# Patient Record
Sex: MALE | Race: ASIAN | Employment: PART TIME | URBAN - METROPOLITAN AREA
[De-identification: names, ages, dates, MRNs, and addresses within clinical notes are randomized per-mention and may not be internally consistent; named-entity substitution may affect disease eponyms.]

---

## 2018-06-29 ENCOUNTER — OFFICE VISIT (OUTPATIENT)
Dept: FAMILY MEDICINE CLINIC | Age: 43
End: 2018-06-29

## 2018-06-29 VITALS
BODY MASS INDEX: 36.68 KG/M2 | RESPIRATION RATE: 17 BRPM | SYSTOLIC BLOOD PRESSURE: 140 MMHG | TEMPERATURE: 96.8 F | HEART RATE: 113 BPM | DIASTOLIC BLOOD PRESSURE: 104 MMHG | HEIGHT: 68 IN | WEIGHT: 242 LBS

## 2018-06-29 DIAGNOSIS — J20.9 ACUTE BRONCHITIS, UNSPECIFIED ORGANISM: ICD-10-CM

## 2018-06-29 DIAGNOSIS — M10.00 IDIOPATHIC GOUT, UNSPECIFIED CHRONICITY, UNSPECIFIED SITE: Primary | ICD-10-CM

## 2018-06-29 PROBLEM — E66.01 SEVERE OBESITY (BMI 35.0-39.9): Status: ACTIVE | Noted: 2018-06-29

## 2018-06-29 RX ORDER — COLCHICINE 0.6 MG/1
0.6 TABLET ORAL DAILY
COMMUNITY
End: 2019-03-18

## 2018-06-29 RX ORDER — BENZONATATE 100 MG/1
100 CAPSULE ORAL
COMMUNITY
End: 2019-03-18

## 2018-06-29 RX ORDER — AZITHROMYCIN 250 MG/1
TABLET, FILM COATED ORAL
Qty: 6 TAB | Refills: 0 | Status: SHIPPED | OUTPATIENT
Start: 2018-06-29 | End: 2018-09-20 | Stop reason: ALTCHOICE

## 2018-06-29 RX ORDER — ALLOPURINOL 300 MG/1
TABLET ORAL DAILY
COMMUNITY

## 2018-06-29 RX ORDER — PREDNISONE 5 MG/1
TABLET ORAL
COMMUNITY
End: 2019-03-18

## 2018-06-29 NOTE — PROGRESS NOTES
Sourav Michelle is a 37 y.o. male presents to office to establish care. Follow up patient first for elevated hemolobin and hematocrit    Medication list has been reviewed with Sourav Michelle and updated as of today's date     Patient has been asked if refills needed as of today's date in which they replied;  NO    Hearing/Vision:   No exam data present    Learning Assessment:     Learning Assessment 6/29/2018   PRIMARY LEARNER Patient   PRIMARY LANGUAGE ENGLISH   LEARNER PREFERENCE PRIMARY DEMONSTRATION   ANSWERED BY patient   RELATIONSHIP SELF       Depression Screening:     PHQ over the last two weeks 7/3/2018   Little interest or pleasure in doing things Not at all   Feeling down, depressed or hopeless Not at all   Total Score PHQ 2 0       Fall Risk Assessment:     Fall Risk Assessment, last 12 mths 6/29/2018   Able to walk? Yes   Fall in past 12 months? No       Abuse Screening:     Abuse Screening Questionnaire 6/29/2018   Do you ever feel afraid of your partner? N   Are you in a relationship with someone who physically or mentally threatens you? N   Is it safe for you to go home?  [place]

## 2018-06-30 NOTE — PROGRESS NOTES
Catrina Castellanos is a 37 y.o.  male and presents as new patient visit with need to be referred to Rheumatology for chronic gout. He also c/o a few days of productive cough. No chronic URI or LRI symptoms. Subjective: Additional Concerns: none     Patient Active Problem List    Diagnosis Date Noted    Severe obesity (BMI 35.0-39.9) ([facility] [address] 06/29/2018     Current Outpatient Prescriptions   Medication Sig Dispense Refill    benzonatate (TESSALON) 100 mg capsule Take 100 mg by mouth three (3) times daily as needed for Cough.  colchicine 0.6 mg tablet Take 0.6 mg by mouth daily.  predniSONE (DELTASONE) 5 mg tablet Take  by mouth.  allopurinol (ZYLOPRIM) 300 mg tablet Take  by mouth daily.  azithromycin (ZITHROMAX) 250 mg tablet 2 today, then 1 daily till gone. 6 Tab 0     No Known Allergies  Past Medical History:   Diagnosis Date    Gout     Hx of biopsy      Past Surgical History:   Procedure Laterality Date    NEUROLOGICAL PROCEDURE UNLISTED       History reviewed. No pertinent family history.   Social History   Substance Use Topics    Smoking status: Never Smoker    Smokeless tobacco: Never Used    Alcohol use 0.6 - 1.8 oz/week     1 - 3 Cans of beer per week     ROS     General: negative for - chills, fatigue, fever, weight change  Psych: negative for - anxiety, depression, irritability or mood swings  ENT: negative for - headaches, hearing change, nasal congestion, oral lesions, sneezing or sore throat  Heme/ Lymph: negative for - bleeding problems, bruising, pallor or swollen lymph nodes  Endo: negative for - hot flashes, polydipsia/polyuria or temperature intolerance  Resp: positive for -  Productive cough, no shortness of breath or wheezing  CV: negative for - chest pain, edema or palpitations    Objective:  Vitals:    06/29/18 1341   BP: (!) 140/104   Pulse: (!) 113   Resp: 17   Temp: 96.8 °F (36 °C)   TempSrc: Oral   Weight: 242 lb (109.8 kg)   Height: 5' 8\" (1.727 m)   PainSc: 5     PE    alert, well appearing, and in no distress, oriented to person, place, and time and overweight  General appearance - alert, well appearing, and in no distress, oriented to person, place, and time and overweight  Mental status - alert, oriented to person, place, and time, normal mood, behavior, speech, dress, motor activity, and thought processes  Chest - clear to auscultation, no wheezes, rales or rhonchi, symmetric air entry  Heart - normal rate, regular rhythm, normal S1, S2, no murmurs, rubs, clicks or gallops  Musculoskeletal - no joint tenderness, deformity or swelling  Extremities - peripheral pulses normal, no pedal edema, no clubbing or cyanosis    LABS   No results found for any previous visit. TESTS  No results found for this or any previous visit. Assessment/Plan:      1. Idiopathic gout, unspecified chronicity, unspecified site  - REFERRAL TO RHEUMATOLOGY    2. Acute bronchitis - Empiric treatment with z kris and symptomatic treatment OTC as needed. Lab review: orders written for new lab studies as appropriate; see orders    I have discussed the diagnosis with the patient and the intended plan as seen in the above orders. The patient has received an after-visit summary and questions were answered concerning future plans. I have discussed medication side effects and warnings with the patient as well. I have reviewed the plan of care with the patient, accepted their input and they are in agreement with the treatment goals. F/U in one week only of not better.      Maciej Lopez MD

## 2018-06-30 NOTE — PATIENT INSTRUCTIONS
Bronchitis: Care Instructions  Your Care Instructions    Bronchitis is inflammation of the bronchial tubes, which carry air to the lungs. The tubes swell and produce mucus, or phlegm. The mucus and inflamed bronchial tubes make you cough. You may have trouble breathing. Most cases of bronchitis are caused by viruses like those that cause colds. Antibiotics usually do not help and they may be harmful. Bronchitis usually develops rapidly and lasts about 2 to 3 weeks in otherwise healthy people. Follow-up care is a key part of your treatment and safety. Be sure to make and go to all appointments, and call your doctor if you are having problems. It's also a good idea to know your test results and keep a list of the medicines you take. How can you care for yourself at home? · Take all medicines exactly as prescribed. Call your doctor if you think you are having a problem with your medicine. · Get some extra rest.  · Take an over-the-counter pain medicine, such as acetaminophen (Tylenol), ibuprofen (Advil, Motrin), or naproxen (Aleve) to reduce fever and relieve body aches. Read and follow all instructions on the label. · Do not take two or more pain medicines at the same time unless the doctor told you to. Many pain medicines have acetaminophen, which is Tylenol. Too much acetaminophen (Tylenol) can be harmful. · Take an over-the-counter cough medicine that contains dextromethorphan to help quiet a dry, hacking cough so that you can sleep. Avoid cough medicines that have more than one active ingredient. Read and follow all instructions on the label. · Breathe moist air from a humidifier, hot shower, or sink filled with hot water. The heat and moisture will thin mucus so you can cough it out. · Do not smoke. Smoking can make bronchitis worse. If you need help quitting, talk to your doctor about stop-smoking programs and medicines. These can increase your chances of quitting for good.   When should you call for help? Call 911 anytime you think you may need emergency care. For example, call if:  ? · You have severe trouble breathing. ?Call your doctor now or seek immediate medical care if:  ? · You have new or worse trouble breathing. ? · You cough up dark brown or bloody mucus (sputum). ? · You have a new or higher fever. ? · You have a new rash. ? Watch closely for changes in your health, and be sure to contact your doctor if:  ? · You cough more deeply or more often, especially if you notice more mucus or a change in the color of your mucus. ? · You are not getting better as expected. Where can you learn more? Go to http://fer-devin.info/. Enter H333 in the search box to learn more about \"Bronchitis: Care Instructions. \"  Current as of: May 12, 2017  Content Version: 11.4  © 6439-9803 [employer]. Care instructions adapted under license by [employer] (which disclaims liability or warranty for this information). If you have questions about a medical condition or this instruction, always ask your healthcare professional. [place] [address] any warranty or liability for your use of this information. Gout: Care Instructions  Your Care Instructions    Gout is a form of arthritis caused by a buildup of uric acid crystals in a joint. It causes sudden attacks of pain, swelling, redness, and stiffness, usually in one joint, especially the big toe. Gout usually comes on without a cause. But it can be brought on by drinking alcohol (especially beer) or eating seafood and red meat. Taking certain medicines, such as diuretics or aspirin, also can bring on an attack of gout. Taking your medicines as prescribed and following up with your doctor regularly can help you avoid gout attacks in the future. Follow-up care is a key part of your treatment and safety.  Be sure to make and go to all appointments, and call your doctor if you are having problems. It's also a good idea to know your test results and keep a list of the medicines you take. How can you care for yourself at home? · If the joint is swollen, put ice or a cold pack on the area for 10 to 20 minutes at a time. Put a thin cloth between the ice and your skin. · Prop up the sore limb on a pillow when you ice it or anytime you sit or lie down during the next 3 days. Try to keep it above the level of your heart. This will help reduce swelling. · Rest sore joints. Avoid activities that put weight or strain on the joints for a few days. Take short rest breaks from your regular activities during the day. · Take your medicines exactly as prescribed. Call your doctor if you think you are having a problem with your medicine. · Take pain medicines exactly as directed. ¨ If the doctor gave you a prescription medicine for pain, take it as prescribed. ¨ If you are not taking a prescription pain medicine, ask your doctor if you can take an over-the-counter medicine. · Eat less seafood and red meat. · Check with your doctor before drinking alcohol. · Losing weight, if you are overweight, may help reduce attacks of gout. But do not go on a [employer]. \" Losing a lot of weight in a short amount of time can cause a gout attack. When should you call for help? Call your doctor now or seek immediate medical care if:  ? · You have a fever. ? · The joint is so painful you cannot use it. ? · You have sudden, unexplained swelling, redness, warmth, or severe pain in one or more joints. ? Watch closely for changes in your health, and be sure to contact your doctor if:  ? · You have joint pain. ? · Your symptoms get worse or are not improving after 2 or 3 days. Where can you learn more? Go to http://fer-devin.info/. Enter R668 in the search box to learn more about \"Gout: Care Instructions. \"  Current as of: October 31, 2016  Content Version: 11.4  © 4616-1900 [employer]. Care instructions adapted under license by [employer] (which disclaims liability or warranty for this information). If you have questions about a medical condition or this instruction, always ask your healthcare professional. [address] any warranty or liability for your use of this information. Purine-Restricted Diet: Care Instructions  Your Care Instructions    Purines are substances that are found in some foods. Your body turns purines into uric acid. High levels of uric acid can cause gout, which is a form of arthritis that causes pain and inflammation in joints. You may be able to help control the amount of uric acid in your body by limiting high-purine foods in your diet. Follow-up care is a key part of your treatment and safety. Be sure to make and go to all appointments, and call your doctor if you are having problems. It's also a good idea to know your test results and keep a list of the medicines you take. How can you care for yourself at home? · Plan your meals and snacks around foods that are low in purines and are safe for you to eat. These foods include:  ¨ Green vegetables and tomatoes. ¨ Fruits. ¨ Whole-grain breads, rice, and cereals. ¨ Eggs, peanut butter, and nuts. ¨ Low-fat milk, cheese, and other milk products. ¨ Popcorn. ¨ Gelatin desserts, chocolate, cocoa, and cakes and sweets, in small amounts. · You can eat certain foods that are medium-high in purines, but eat them only once in a while. These foods include:  ¨ Legumes, such as dried beans and dried peas. You can have 1 cup cooked legumes each day. ¨ Asparagus, cauliflower, spinach, mushrooms, and green peas. ¨ Fish and seafood (other than very high-purine seafood). ¨ Oatmeal, wheat bran, and wheat germ. · Limit very high-purine foods, including:  ¨ Organ meats, such as liver, kidneys, sweetbreads, and brains. ¨ Meats, including harden, beef, pork, and lamb.   ¨ Game meats and any other meats in large amounts. ¨ Anchovies, sardines, herring, mackerel, and scallops. ¨ Gravy. ¨ Beer. Where can you learn more? Go to http://fer-devin.info/. Enter F448 in the search box to learn more about \"Purine-Restricted Diet: Care Instructions. \"  Current as of: May 12, 2017  Content Version: 11.4  © 0935-6039 Healthwise, [employer]. Care instructions adapted under license by [employer] (which disclaims liability or warranty for this information). If you have questions about a medical condition or this instruction, always ask your healthcare professional. [place] [address] any warranty or liability for your use of this information.

## 2018-09-20 ENCOUNTER — OFFICE VISIT (OUTPATIENT)
Dept: FAMILY MEDICINE CLINIC | Age: 43
End: 2018-09-20

## 2018-09-20 ENCOUNTER — HOSPITAL ENCOUNTER (OUTPATIENT)
Dept: LAB | Age: 43
Discharge: HOME | End: 2018-09-20
Payer: MEDICAID

## 2018-09-20 VITALS
HEIGHT: 68 IN | OXYGEN SATURATION: 98 % | RESPIRATION RATE: 17 BRPM | BODY MASS INDEX: 36.22 KG/M2 | DIASTOLIC BLOOD PRESSURE: 88 MMHG | TEMPERATURE: 96.8 F | HEART RATE: 93 BPM | SYSTOLIC BLOOD PRESSURE: 134 MMHG | WEIGHT: 239 LBS

## 2018-09-20 DIAGNOSIS — Z00.00 PHYSICAL EXAM: ICD-10-CM

## 2018-09-20 DIAGNOSIS — M10.00 ACUTE IDIOPATHIC GOUT, UNSPECIFIED SITE: Primary | ICD-10-CM

## 2018-09-20 DIAGNOSIS — M10.00 ACUTE IDIOPATHIC GOUT, UNSPECIFIED SITE: ICD-10-CM

## 2018-09-20 DIAGNOSIS — R06.2 WHEEZING: ICD-10-CM

## 2018-09-20 LAB
ALBUMIN SERPL-MCNC: 4.2 G/DL (ref 3.4–5)
ALBUMIN/GLOB SERPL: 1.1 {RATIO} (ref 0.8–1.7)
ALP SERPL-CCNC: 112 U/L (ref 45–117)
ALT SERPL-CCNC: 81 U/L (ref 16–61)
ANION GAP SERPL CALC-SCNC: 11 MMOL/L (ref 3–18)
AST SERPL-CCNC: 34 U/L (ref 15–37)
BASOPHILS # BLD: 0.1 K/UL (ref 0–0.1)
BASOPHILS NFR BLD: 1 % (ref 0–2)
BILIRUB SERPL-MCNC: 0.6 MG/DL (ref 0.2–1)
BUN SERPL-MCNC: 20 MG/DL (ref 7–18)
BUN/CREAT SERPL: 16 (ref 12–20)
CALCIUM SERPL-MCNC: 9.2 MG/DL (ref 8.5–10.1)
CHLORIDE SERPL-SCNC: 106 MMOL/L (ref 100–108)
CHOLEST SERPL-MCNC: 246 MG/DL
CO2 SERPL-SCNC: 24 MMOL/L (ref 21–32)
CREAT SERPL-MCNC: 1.28 MG/DL (ref 0.6–1.3)
DIFFERENTIAL METHOD BLD: ABNORMAL
EOSINOPHIL # BLD: 0.4 K/UL (ref 0–0.4)
EOSINOPHIL NFR BLD: 7 % (ref 0–5)
ERYTHROCYTE [DISTWIDTH] IN BLOOD BY AUTOMATED COUNT: 14.6 % (ref 11.6–14.5)
EST. AVERAGE GLUCOSE BLD GHB EST-MCNC: 114 MG/DL
GLOBULIN SER CALC-MCNC: 3.9 G/DL (ref 2–4)
GLUCOSE SERPL-MCNC: 104 MG/DL (ref 74–99)
HBA1C MFR BLD: 5.6 % (ref 4.2–5.6)
HCT VFR BLD AUTO: 54.2 % (ref 36–48)
HDLC SERPL-MCNC: 41 MG/DL (ref 40–60)
HDLC SERPL: 6 {RATIO} (ref 0–5)
HGB BLD-MCNC: 18.2 G/DL (ref 13–16)
LDLC SERPL CALC-MCNC: 156 MG/DL (ref 0–100)
LIPID PROFILE,FLP: ABNORMAL
LYMPHOCYTES # BLD: 1.9 K/UL (ref 0.9–3.6)
LYMPHOCYTES NFR BLD: 31 % (ref 21–52)
MCH RBC QN AUTO: 32.9 PG (ref 24–34)
MCHC RBC AUTO-ENTMCNC: 33.6 G/DL (ref 31–37)
MCV RBC AUTO: 97.8 FL (ref 74–97)
MONOCYTES # BLD: 0.6 K/UL (ref 0.05–1.2)
MONOCYTES NFR BLD: 10 % (ref 3–10)
NEUTS SEG # BLD: 3.1 K/UL (ref 1.8–8)
NEUTS SEG NFR BLD: 51 % (ref 40–73)
PLATELET # BLD AUTO: 198 K/UL (ref 135–420)
PMV BLD AUTO: 10.4 FL (ref 9.2–11.8)
POTASSIUM SERPL-SCNC: 4.2 MMOL/L (ref 3.5–5.5)
PROT SERPL-MCNC: 8.1 G/DL (ref 6.4–8.2)
RBC # BLD AUTO: 5.54 M/UL (ref 4.7–5.5)
SODIUM SERPL-SCNC: 141 MMOL/L (ref 136–145)
TRIGL SERPL-MCNC: 245 MG/DL (ref ?–150)
TSH SERPL DL<=0.05 MIU/L-ACNC: 2.6 UIU/ML (ref 0.36–3.74)
URATE SERPL-MCNC: 5.3 MG/DL (ref 2.6–7.2)
VLDLC SERPL CALC-MCNC: 49 MG/DL
WBC # BLD AUTO: 6 K/UL (ref 4.6–13.2)

## 2018-09-20 PROCEDURE — 80061 LIPID PANEL: CPT | Performed by: FAMILY MEDICINE

## 2018-09-20 PROCEDURE — 80053 COMPREHEN METABOLIC PANEL: CPT | Performed by: FAMILY MEDICINE

## 2018-09-20 PROCEDURE — 84443 ASSAY THYROID STIM HORMONE: CPT | Performed by: FAMILY MEDICINE

## 2018-09-20 PROCEDURE — 84550 ASSAY OF BLOOD/URIC ACID: CPT | Performed by: FAMILY MEDICINE

## 2018-09-20 PROCEDURE — 85025 COMPLETE CBC W/AUTO DIFF WBC: CPT | Performed by: FAMILY MEDICINE

## 2018-09-20 PROCEDURE — 36415 COLL VENOUS BLD VENIPUNCTURE: CPT | Performed by: FAMILY MEDICINE

## 2018-09-20 PROCEDURE — 83036 HEMOGLOBIN GLYCOSYLATED A1C: CPT | Performed by: FAMILY MEDICINE

## 2018-09-20 RX ORDER — ALBUTEROL SULFATE 90 UG/1
2 AEROSOL, METERED RESPIRATORY (INHALATION)
Qty: 1 INHALER | Refills: 3 | Status: SHIPPED | OUTPATIENT
Start: 2018-09-20

## 2018-09-20 NOTE — PATIENT INSTRUCTIONS

## 2018-09-20 NOTE — PROGRESS NOTES
Cindy Lopez is a 37 y.o. male presents to office for cough and wheezing    Medication list has been reviewed with Cindy Lopez and updated as of today's date     Health Maintenance items with a due date reviewed with patient:  Health Maintenance Due   Topic Date Due    DTaP/Tdap/Td series (1 - Tdap) 01/31/1996    Influenza Age 5 to Adult  08/01/2018

## 2018-09-20 NOTE — LETTER
9/20/2018 2:48 PM 
 
Mr. Salima Bullock [id number] [facility] [address] Dear Salima Bullock I have reviewed your results and have found the results listed below to be within normal ranges. CXR, this needs to be relayed to pulmonology when the see him for asthma work up. My recommendations are as follows: Follow up as scheduled. Please call if you have any questions [phone] . Sincerely, Tomy Jean Baptiste MD

## 2018-09-21 NOTE — PROGRESS NOTES
Dalton Glover is a 37 y.o.  male and presents with screening physical, chronic wheezing and hx of gout not active at this time. Chief Complaint   Patient presents with    Cough     Subjective: Additional Concerns: none     Patient Active Problem List    Diagnosis Date Noted    Severe obesity (BMI 35.0-39.9) ([facility] [address] 06/29/2018     Current Outpatient Prescriptions   Medication Sig Dispense Refill    albuterol (PROVENTIL HFA, VENTOLIN HFA, PROAIR HFA) 90 mcg/actuation inhaler Take 2 Puffs by inhalation every six (6) hours as needed for Wheezing or Shortness of Breath. Indications: BRONCHOSPASM PREVENTION 1 Inhaler 3    allopurinol (ZYLOPRIM) 300 mg tablet Take  by mouth daily.  benzonatate (TESSALON) 100 mg capsule Take 100 mg by mouth three (3) times daily as needed for Cough.  colchicine 0.6 mg tablet Take 0.6 mg by mouth daily.  predniSONE (DELTASONE) 5 mg tablet Take  by mouth. No Known Allergies  Past Medical History:   Diagnosis Date    Gout     Hx of biopsy      Past Surgical History:   Procedure Laterality Date    NEUROLOGICAL PROCEDURE UNLISTED       History reviewed. No pertinent family history.   Social History   Substance Use Topics    Smoking status: Never Smoker    Smokeless tobacco: Never Used    Alcohol use 0.6 - 1.8 oz/week     1 - 3 Cans of beer per week     ROS     General: negative for - chills, fatigue, fever, weight change  Psych: negative for - anxiety, depression, irritability or mood swings  ENT: negative for - headaches, hearing change, nasal congestion, oral lesions, sneezing or sore throat  Heme/ Lymph: negative for - bleeding problems, bruising, pallor or swollen lymph nodes  Endo: negative for - hot flashes, polydipsia/polyuria or temperature intolerance  Resp: negative for - cough, shortness of breath or wheezing  CV: negative for - chest pain, edema or palpitations  GI: negative for - abdominal pain, change in bowel habits, constipation, diarrhea or nausea/vomiting  MSK: negative for - joint pain, joint swelling or muscle pain  Neuro: negative for - confusion, headaches, seizures or weakness    Objective:  Vitals:    09/20/18 0842   BP: 134/88   Pulse: 93   Resp: 17   Temp: 96.8 °F (36 °C)   TempSrc: Oral   SpO2: 98%   Weight: 239 lb (108.4 kg)   Height: 5' 8\" (1.727 m)   PainSc:   0 - No pain     PE    Alert, well appearing, and in no distress, oriented to person, place, and time and overweight  General appearance - alert, well appearing, and in no distress, oriented to person, place, and time and overweight  Mental status - alert, oriented to person, place, and time, normal mood, behavior, speech, dress, motor activity, and thought processes  Chest - clear to auscultation, no wheezes, rales or rhonchi, symmetric air entry  Heart - normal rate, regular rhythm, normal S1, S2, no murmurs, rubs, clicks or gallops  Extremities - peripheral pulses normal, no pedal edema, no clubbing or cyanosis    [address] [facility] Outpatient Visit on 09/20/2018   Component Date Value Ref Range Status    Uric acid 09/20/2018 5.3  2.6 - 7.2 MG/DL Final    Comment: The drugs N-acetylcysteine (NAC) and  Metamiszole have been found to cause falsely  low results in this chemical assay. Please  be sure to submit blood samples obtained  BEFORE administration of either of these  drugs to assure correct results.       Sodium 09/20/2018 141  136 - 145 mmol/L Final    Potassium 09/20/2018 4.2  3.5 - 5.5 mmol/L Final    Chloride 09/20/2018 106  100 - 108 mmol/L Final    CO2 09/20/2018 24  21 - 32 mmol/L Final    Anion gap 09/20/2018 11  3.0 - 18 mmol/L Final    Glucose 09/20/2018 104* 74 - 99 mg/dL Final    BUN 09/20/2018 20* 7.0 - 18 MG/DL Final    Creatinine 09/20/2018 1.28  0.6 - 1.3 MG/DL Final    BUN/Creatinine ratio 09/20/2018 16  12 - 20   Final    GFR est AA 09/20/2018 >60  >60 ml/min/1.73m2 Final    GFR est non-AA 09/20/2018 >60  >60 ml/min/1.73m2 Final    Comment: (NOTE)  Estimated GFR is calculated using the Modification of Diet in Renal   Disease (MDRD) Study equation, reported for both  Americans   (GFRAA) and non- Americans (GFRNA), and normalized to 1.73m2   body surface area. The physician must decide which value applies to   the patient. The MDRD study equation should only be used in   individuals age 25 or older. It has not been validated for the   following: pregnant women, patients with serious comorbid conditions,   or on certain medications, or persons with extremes of body size,   muscle mass, or nutritional status.  Calcium 09/20/2018 9.2  8.5 - 10.1 MG/DL Final    Bilirubin, total 09/20/2018 0.6  0.2 - 1.0 MG/DL Final    ALT (SGPT) 09/20/2018 81* 16 - 61 U/L Final    AST (SGOT) 09/20/2018 34  15 - 37 U/L Final    Alk. phosphatase 09/20/2018 112  45 - 117 U/L Final    Protein, total 09/20/2018 8.1  6.4 - 8.2 g/dL Final    Albumin 09/20/2018 4.2  3.4 - 5.0 g/dL Final    Globulin 09/20/2018 3.9  2.0 - 4.0 g/dL Final    A-G Ratio 09/20/2018 1.1  0.8 - 1.7   Final    LIPID PROFILE 09/20/2018        Final    Cholesterol, total 09/20/2018 246* <200 MG/DL Final    Triglyceride 09/20/2018 245* <150 MG/DL Final    Comment: The drugs N-acetylcysteine (NAC) and  Metamiszole have been found to cause falsely  low results in this chemical assay. Please  be sure to submit blood samples obtained  BEFORE administration of either of these  drugs to assure correct results.       HDL Cholesterol 09/20/2018 41  40 - 60 MG/DL Final    LDL, calculated 09/20/2018 156* 0 - 100 MG/DL Final    VLDL, calculated 09/20/2018 49  MG/DL Final    CHOL/HDL Ratio 09/20/2018 6.0* 0 - 5.0   Final    WBC 09/20/2018 6.0  4.6 - 13.2 K/uL Final    RBC 09/20/2018 5.54* 4.70 - 5.50 M/uL Final    HGB 09/20/2018 18.2* 13.0 - 16.0 g/dL Final    HCT 09/20/2018 54.2* 36.0 - 48.0 % Final    MCV 09/20/2018 97.8* 74.0 - 97.0 FL Final    MCH 09/20/2018 32.9  24.0 - 34.0 PG Final    MCHC 09/20/2018 33.6  31.0 - 37.0 g/dL Final    RDW 09/20/2018 14.6* 11.6 - 14.5 % Final    PLATELET 79/20/7070 514  135 - 420 K/uL Final    MPV 09/20/2018 10.4  9.2 - 11.8 FL Final    NEUTROPHILS 09/20/2018 51  40 - 73 % Final    LYMPHOCYTES 09/20/2018 31  21 - 52 % Final    MONOCYTES 09/20/2018 10  3 - 10 % Final    EOSINOPHILS 09/20/2018 7* 0 - 5 % Final    BASOPHILS 09/20/2018 1  0 - 2 % Final    ABS. NEUTROPHILS 09/20/2018 3.1  1.8 - 8.0 K/UL Final    ABS. LYMPHOCYTES 09/20/2018 1.9  0.9 - 3.6 K/UL Final    ABS. MONOCYTES 09/20/2018 0.6  0.05 - 1.2 K/UL Final    ABS. EOSINOPHILS 09/20/2018 0.4  0.0 - 0.4 K/UL Final    ABS. BASOPHILS 09/20/2018 0.1  0.0 - 0.1 K/UL Final    DF 09/20/2018 AUTOMATED    Final    TSH 09/20/2018 2.60  0.36 - 3.74 uIU/mL Final    Hemoglobin A1c 09/20/2018 5.6  4.2 - 5.6 % Final    Comment: (NOTE)  HbA1C Interpretive Ranges  <5.7              Normal  5.7 - 6.4         Consider Prediabetes  >6.5              Consider Diabetes      Est. average glucose 09/20/2018 114  mg/dL Final    Comment: (NOTE)  The eAG should be interpreted with patient characteristics in mind   since ethnicity, interindividual differences, red cell lifespan,   variation in rates of glycation, etc. may affect the validity of the   calculation. TESTS  None    Assessment/Plan:      1. Acute idiopathic gout, unspecified site  - URIC ACID; Future  - METABOLIC PANEL, COMPREHENSIVE; Future    2. Wheezing, chronic   - albuterol (PROVENTIL HFA, VENTOLIN HFA, PROAIR HFA) 90 mcg/actuation inhaler; Take 2 Puffs by inhalation every six (6) hours as needed for Wheezing or Shortness of Breath. Indications: BRONCHOSPASM PREVENTION  Dispense: 1 Inhaler; Refill: 3  - XR CHEST PA LAT; Future  - REFERRAL TO PULMONARY DISEASE    3. Physical exam  - LIPID PANEL; Future  - CBC WITH AUTOMATED DIFF; Future  - METABOLIC PANEL, COMPREHENSIVE; Future  - TSH 3RD GENERATION;  Future  - HEMOGLOBIN A1C WITH EAG; Future    Lab review: orders written for new lab studies as appropriate; see orders. I have discussed the diagnosis with the patient and the intended plan as seen in the above orders. The patient has received an after-visit summary and questions were answered concerning future plans. I have discussed medication side effects and warnings with the patient as well. I have reviewed the plan of care with the patient, accepted their input and they are in agreement with the treatment goals. F/U in one year.      Yuliet Spence MD

## 2018-09-21 NOTE — PROGRESS NOTES
Pls inform patient his labs normal to near normal. Uric normal but bad chol high. Plan is to try to diet and exercise. His hgb tends to be high as well and just ask not to smoke or quit smoking if he is. Recheck abnormal labs in 3-6 months.

## 2018-11-20 ENCOUNTER — OFFICE VISIT (OUTPATIENT)
Dept: FAMILY MEDICINE CLINIC | Age: 43
End: 2018-11-20

## 2018-11-20 VITALS
SYSTOLIC BLOOD PRESSURE: 135 MMHG | RESPIRATION RATE: 12 BRPM | DIASTOLIC BLOOD PRESSURE: 88 MMHG | TEMPERATURE: 97.6 F | HEART RATE: 109 BPM | BODY MASS INDEX: 37.89 KG/M2 | WEIGHT: 250 LBS | OXYGEN SATURATION: 94 % | HEIGHT: 68 IN

## 2018-11-20 DIAGNOSIS — H00.012 HORDEOLUM EXTERNUM OF RIGHT LOWER EYELID: Primary | ICD-10-CM

## 2018-11-20 DIAGNOSIS — Z23 ENCOUNTER FOR IMMUNIZATION: ICD-10-CM

## 2018-11-20 DIAGNOSIS — H04.203 WATERY EYES: ICD-10-CM

## 2018-11-20 RX ORDER — ARIPIPRAZOLE 300 MG
KIT INTRAMUSCULAR
COMMUNITY
Start: 2018-11-13 | End: 2019-03-18

## 2018-11-20 RX ORDER — ERYTHROMYCIN 5 MG/G
OINTMENT OPHTHALMIC
Qty: 1 G | Refills: 0 | Status: SHIPPED | OUTPATIENT
Start: 2018-11-20 | End: 2019-03-18

## 2018-11-20 RX ORDER — LAMOTRIGINE 25 MG/1
TABLET ORAL
COMMUNITY
Start: 2018-11-03 | End: 2019-09-25 | Stop reason: SDUPTHER

## 2018-11-20 NOTE — PROGRESS NOTES
Moody Anderson is a 37 y.o. male (: 1975) presenting to address:    Chief Complaint   Patient presents with    Watery Eyes     accompanied with swelling that started a couple of days ago       Vitals:    18 1429   BP: 135/88   Pulse: (!) 109   Resp: 12   Temp: 97.6 °F (36.4 °C)   TempSrc: Oral   SpO2: 94%   Weight: 250 lb (113.4 kg)   Height: 5' 8\" (1.727 m)   PainSc:   0 - No pain       Hearing/Vision:   No exam data present    Learning Assessment:     Learning Assessment 2018   PRIMARY LEARNER Patient   PRIMARY LANGUAGE ENGLISH   LEARNER PREFERENCE PRIMARY DEMONSTRATION   ANSWERED BY patient   RELATIONSHIP SELF     Depression Screening:     PHQ over the last two weeks 2018   Little interest or pleasure in doing things Several days   Feeling down, depressed, irritable, or hopeless Not at all   Total Score PHQ 2 1     Fall Risk Assessment:     Fall Risk Assessment, last 12 mths 2018   Able to walk? Yes   Fall in past 12 months? No     Abuse Screening:     Abuse Screening Questionnaire 2018   Do you ever feel afraid of your partner? N   Are you in a relationship with someone who physically or mentally threatens you? N   Is it safe for you to go home? Y     Coordination of Care Questionaire:   1. Have you been to the ER, urgent care clinic since your last visit? Hospitalized since your last visit? NO    2. Have you seen or consulted any other health care providers outside of the [address] [place] [address] since your last visit? Include any pap smears or colon screening. NO    Advanced Directive:   1. Do you have an Advanced Directive? NO    2. Would you like information on Advanced Directives? YES    Pt request flu vaccine. Flu Immunization/s administered 2018 by Michela Eddy in left deltoid. Patient tolerated procedure well. No reactions noted.

## 2018-11-21 NOTE — PROGRESS NOTES
EDWARDO Cardoza is a 45-year-old male who presents to center with complaint of swelling and tenderness margin of the lower right eyelid for 3 consecutive days. First day was mildly swollen and tender worse as well as getting red. He has had very watery eyes daily basis for about 2 weeks. He thought maybe it was allergies, but he has no runny nose, sore throat or congestion. Past Medical History:   Diagnosis Date    Gout     Hx of biopsy      . Current Outpatient Medications on File Prior to Visit   Medication Sig Dispense Refill    albuterol (PROVENTIL HFA, VENTOLIN HFA, PROAIR HFA) 90 mcg/actuation inhaler Take 2 Puffs by inhalation every six (6) hours as needed for Wheezing or Shortness of Breath. Indications: BRONCHOSPASM PREVENTION 1 Inhaler 3    benzonatate (TESSALON) 100 mg capsule Take 100 mg by mouth three (3) times daily as needed for Cough.  colchicine 0.6 mg tablet Take 0.6 mg by mouth daily.  predniSONE (DELTASONE) 5 mg tablet Take  by mouth.  allopurinol (ZYLOPRIM) 300 mg tablet Take  by mouth daily.  ABILIFY MAINTENA 300 mg serr injection       lamoTRIgine (LAMICTAL) 25 mg (35) DsPk        No current facility-administered medications on file prior to visit. ROS  Constitutional: Denies fever, chills, fatigue  Eyes: Denies visual disturbance except eyes get very watery and vision is temporarily blurry. Denies any discharge in the eye  ENT: Denies sore throat, earache or congestion      Objective  Visit Vitals  /88   Pulse (!) 109   Temp 97.6 °F (36.4 °C) (Oral)   Resp 12   Ht 5' 8\" (1.727 m)   Wt 250 lb (113.4 kg)   SpO2 94%   BMI 38.01 kg/m²       Physical Exam  Constitutional: No apparent distress, appears well-nourished  Eyes: Both eyes are watery without injection; mild erythema swelling he will aspect of the border of the right lower eyelid. Mildly tender to palpation. swelling the inner aspect of the eyelid also.   PERRLA.  ENT: TMs pearly gray without retraction or bulging, nasal passages clear, throat and tonsils without swelling erythema  Neck: Supple, no cervical adenopathy    Diagnoses and all orders for this visit:    1. Hordeolum externum of right lower eyelid  -     erythromycin (ILOTYCIN) ophthalmic ointment; Apply thin layer to affected eye(s) twice daily    2. Encounter for immunization  -     INFLUENZA VIRUS VAC QUAD,SPLIT,PRESV FREE SYRINGE IM    3. Watery eyes      Patient treated for possible stye with ophthalmic antibiotic. He also is even instructions regarding application of warm moist compress. Regarding the watery eyes, advised him to try an oral antihistamine. If this does not help, then I advised him to follow-up with ophthalmology for further evaluation. Questions answered and patient verbalized understanding and agreed with plan.     LANDON Petty

## 2019-03-18 ENCOUNTER — OFFICE VISIT (OUTPATIENT)
Dept: FAMILY MEDICINE CLINIC | Age: 44
End: 2019-03-18

## 2019-03-18 ENCOUNTER — HOSPITAL ENCOUNTER (OUTPATIENT)
Dept: LAB | Age: 44
Discharge: HOME | End: 2019-03-18
Payer: MEDICARE

## 2019-03-18 VITALS
HEIGHT: 68 IN | OXYGEN SATURATION: 98 % | WEIGHT: 261 LBS | HEART RATE: 89 BPM | SYSTOLIC BLOOD PRESSURE: 143 MMHG | BODY MASS INDEX: 39.56 KG/M2 | DIASTOLIC BLOOD PRESSURE: 96 MMHG | RESPIRATION RATE: 17 BRPM | TEMPERATURE: 97.4 F

## 2019-03-18 DIAGNOSIS — R73.09 ABNORMAL BLOOD SUGAR: ICD-10-CM

## 2019-03-18 DIAGNOSIS — F20.9 SCHIZOPHRENIA, UNSPECIFIED TYPE (HCC): ICD-10-CM

## 2019-03-18 DIAGNOSIS — R03.0 ELEVATED BLOOD PRESSURE READING: ICD-10-CM

## 2019-03-18 DIAGNOSIS — E78.2 MIXED HYPERLIPIDEMIA: Primary | ICD-10-CM

## 2019-03-18 DIAGNOSIS — D58.2 ELEVATED HEMOGLOBIN (HCC): ICD-10-CM

## 2019-03-18 DIAGNOSIS — L98.9 SKIN LESION: ICD-10-CM

## 2019-03-18 DIAGNOSIS — E66.01 SEVERE OBESITY WITH BODY MASS INDEX (BMI) OF 35.0 TO 39.9 WITH SERIOUS COMORBIDITY (HCC): ICD-10-CM

## 2019-03-18 DIAGNOSIS — M1A.09X1 IDIOPATHIC CHRONIC GOUT OF MULTIPLE SITES WITH TOPHUS: ICD-10-CM

## 2019-03-18 DIAGNOSIS — D75.1 POLYCYTHEMIA: ICD-10-CM

## 2019-03-18 DIAGNOSIS — E78.2 MIXED HYPERLIPIDEMIA: ICD-10-CM

## 2019-03-18 LAB
CHOLEST SERPL-MCNC: 258 MG/DL
HDLC SERPL-MCNC: 38 MG/DL (ref 40–60)
HDLC SERPL: 6.8 {RATIO} (ref 0–5)
LDLC SERPL CALC-MCNC: 142.2 MG/DL (ref 0–100)
LIPID PROFILE,FLP: ABNORMAL
TRIGL SERPL-MCNC: 389 MG/DL (ref ?–150)
VLDLC SERPL CALC-MCNC: 77.8 MG/DL

## 2019-03-18 PROCEDURE — 83036 HEMOGLOBIN GLYCOSYLATED A1C: CPT

## 2019-03-18 PROCEDURE — 85025 COMPLETE CBC W/AUTO DIFF WBC: CPT

## 2019-03-18 PROCEDURE — 80048 BASIC METABOLIC PNL TOTAL CA: CPT

## 2019-03-18 PROCEDURE — 36415 COLL VENOUS BLD VENIPUNCTURE: CPT

## 2019-03-18 PROCEDURE — 80061 LIPID PANEL: CPT

## 2019-03-18 NOTE — PROGRESS NOTES
[address]     Chief Complaint   Patient presents with    Cholesterol Problem    Obesity    Gout    Establish Care     Vitals:    03/18/19 0806   BP: (!) 143/96   Pulse: 89   Resp: 17   Temp: 97.4 °F (36.3 °C)   TempSrc: Oral   SpO2: 98%   Weight: 261 lb (118.4 kg)   Height: 5' 8\" (1.727 m)   PainSc:   0 - No pain         HPI: Patient is here because he is concerned about his health and his cholesterol level which was checked 6 months ago it was high, patient has put over 20 pounds since last visit!!!  Due to poor eating habits. Patient is not following any lifestyle modification, and he need to increase his physical activity as well. Hemoglobin was elevated last visit  But patient used to smoke but now he quit smoking we will CBC today. She has schizophrenia and he is stable on current medications. He is concerned about a skin lesion on the left side of his abdomen that stayed for 2-month it was like inflamed and red but now it has resolved and the center is like a hyperpigmented area that feels like a defect underneath. Past Medical History:   Diagnosis Date    Gout     Hx of biopsy      Past Surgical History:   Procedure Laterality Date    NEUROLOGICAL PROCEDURE UNLISTED       Social History     Tobacco Use    Smoking status: Never Smoker    Smokeless tobacco: Never Used   Substance Use Topics    Alcohol use: Yes     Alcohol/week: 0.6 - 1.8 oz     Types: 1 - 3 Cans of beer per week       Family History   Problem Relation Age of Onset    No Known Problems Mother     Depression Father     Bipolar Disorder Father        Review of Systems   Constitutional: Negative for chills, fever, malaise/fatigue and weight loss. HENT: Negative for congestion, ear discharge, ear pain, hearing loss, nosebleeds, sinus pain and sore throat. Eyes: Negative for blurred vision, double vision and discharge. Respiratory: Positive for wheezing.  Negative for cough, hemoptysis, sputum production and shortness of breath. Cardiovascular: Negative for chest pain, palpitations, claudication and leg swelling. Gastrointestinal: Negative for abdominal pain, constipation, diarrhea, nausea and vomiting. Genitourinary: Negative for dysuria, frequency, hematuria and urgency. Musculoskeletal: Negative for back pain, joint pain, myalgias and neck pain. Skin: Negative for itching and rash. Neurological: Negative for dizziness, tingling, sensory change, speech change, focal weakness, seizures, loss of consciousness, weakness and headaches. Psychiatric/Behavioral: Negative for depression, hallucinations, substance abuse and suicidal ideas. The patient is not nervous/anxious and does not have insomnia. Physical Exam   Constitutional: He is oriented to person, place, and time. He appears well-developed and well-nourished. No distress. HENT:   Head: Normocephalic and atraumatic. Mouth/Throat: No oropharyngeal exudate. Eyes: Conjunctivae are normal. Pupils are equal, round, and reactive to light. Right eye exhibits no discharge. Left eye exhibits no discharge. No scleral icterus. Neck: Normal range of motion. Neck supple. No thyromegaly present. Cardiovascular: Normal rate, regular rhythm and normal heart sounds. Pulmonary/Chest: Effort normal and breath sounds normal. No respiratory distress. He has no rales. Abdominal: Soft. Bowel sounds are normal. He exhibits no distension and no mass. There is no tenderness. There is no rebound. Musculoskeletal: Normal range of motion. He exhibits no edema, tenderness or deformity. Lymphadenopathy:     He has no cervical adenopathy. Neurological: He is alert and oriented to person, place, and time. No cranial nerve deficit. Coordination normal.   Skin: Skin is warm and dry. No rash noted. He is not diaphoretic. No erythema.    Patient has lesion on the left side of lower abdomen for about 2-month stated that it started as a red swelling possible abscess or infection was never treated and now there is like hyperpigmented 1 cm area it feels fluctuant with an underlying defect on the skin for kind , I think it remaining scar after the infection    Nontender   Psychiatric: He has a normal mood and affect. Judgment and thought content normal.   Nursing note and vitals reviewed. Assessment and plan     Plan of care has been discussed with the patient, he agrees to the plan and verbalized understanding. All his questions were answered  More than 50% of the time spent in this visit was counseling the patient about  illness and treatment options         1. Schizophrenia, unspecified type ([facility] [address]    - METABOLIC PANEL, BASIC; Future    2. Severe obesity with body mass index (BMI) of 35.0 to 39.9 with [facility]    I have discussed this patient in length regarding lifestyle modification decrease sugar and sugary drinks and carbohydrates and increase physical activity patient understand his fiancée is  and she is quite healthy, she said he is only when he is not compliant because she cooks for at home and he eats and healthy and drink soda and sugar drinks  - REFERRAL TO DIETITIAN  - METABOLIC PANEL, BASIC; Future  - HEMOGLOBIN A1C W/O EAG; Future    3. Idiopathic chronic gout of multiple sites with tophus    - METABOLIC PANEL, BASIC; Future    4. Mixed hyperlipidemia    - LIPID PANEL; Future  - METABOLIC PANEL, BASIC; Future    5. Abnormal blood sugar    - REFERRAL TO DIETITIAN  - METABOLIC PANEL, BASIC; Future  - HEMOGLOBIN A1C W/O EAG; Future    6. Elevated blood pressure reading  Low-salt intake has been discussed with patient will repeat blood pressure next visit  - REFERRAL TO DIETITIAN  - METABOLIC PANEL, BASIC; Future    7. Elevated hemoglobin (HCC)    - CBC WITH AUTOMATED DIFF; Future  - METABOLIC PANEL, BASIC; Future    8.  Skin lesion    - REFERRAL TO DERMATOLOGY    Current Outpatient Medications   Medication Sig Dispense Refill    lamoTRIgine (LAMICTAL) 25 mg (35) DsPk       albuterol (PROVENTIL HFA, VENTOLIN HFA, PROAIR HFA) 90 mcg/actuation inhaler Take 2 Puffs by inhalation every six (6) hours as needed for Wheezing or Shortness of Breath. Indications: BRONCHOSPASM PREVENTION 1 Inhaler 3    allopurinol (ZYLOPRIM) 300 mg tablet Take  by mouth daily. Patient Active Problem List    Diagnosis Date Noted    Severe obesity with body mass index (BMI) of 35.0 to 39.9 with serious comorbidity ([facility] [address] 06/29/2018    Schizophrenia ([facility] 01/26/2017    Idiopathic chronic gout of multiple sites with [facility] 09/12/2016     Results for orders placed or performed during the hospital encounter of 09/20/18   URIC ACID   Result Value Ref Range    Uric acid 5.3 2.6 - 7.2 MG/DL   METABOLIC PANEL, COMPREHENSIVE   Result Value Ref Range    Sodium 141 136 - 145 mmol/L    Potassium 4.2 3.5 - 5.5 mmol/L    Chloride 106 100 - 108 mmol/L    CO2 24 21 - 32 mmol/L    Anion gap 11 3.0 - 18 mmol/L    Glucose 104 (H) 74 - 99 mg/dL    BUN 20 (H) 7.0 - 18 MG/DL    Creatinine 1.28 0.6 - 1.3 MG/DL    BUN/Creatinine ratio 16 12 - 20      GFR est AA >60 >60 ml/min/1.73m2    GFR est non-AA >60 >60 ml/min/1.73m2    Calcium 9.2 8.5 - 10.1 MG/DL    Bilirubin, total 0.6 0.2 - 1.0 MG/DL    ALT (SGPT) 81 (H) 16 - 61 U/L    AST (SGOT) 34 15 - 37 U/L    Alk.  phosphatase 112 45 - 117 U/L    Protein, total 8.1 6.4 - 8.2 g/dL    Albumin 4.2 3.4 - 5.0 g/dL    Globulin 3.9 2.0 - 4.0 g/dL    A-G Ratio 1.1 0.8 - 1.7     LIPID PANEL   Result Value Ref Range    LIPID PROFILE          Cholesterol, total 246 (H) <200 MG/DL    Triglyceride 245 (H) <150 MG/DL    HDL Cholesterol 41 40 - 60 MG/DL    LDL, calculated 156 (H) 0 - 100 MG/DL    VLDL, calculated 49 MG/DL    CHOL/HDL Ratio 6.0 (H) 0 - 5.0     CBC WITH AUTOMATED DIFF   Result Value Ref Range    WBC 6.0 4.6 - 13.2 K/uL    RBC 5.54 (H) 4.70 - 5.50 M/uL    HGB 18.2 (H) 13.0 - 16.0 g/dL    HCT 54.2 (H) 36.0 - 48.0 %    MCV 97.8 (H) 74.0 - 97.0 FL    MCH 32.9 24.0 - 34.0 PG    MCHC 33.6 31.0 - 37.0 g/dL    RDW 14.6 (H) 11.6 - 14.5 %    PLATELET 879 447 - 511 K/uL    MPV 10.4 9.2 - 11.8 FL    NEUTROPHILS 51 40 - 73 %    LYMPHOCYTES 31 21 - 52 %    MONOCYTES 10 3 - 10 %    EOSINOPHILS 7 (H) 0 - 5 %    BASOPHILS 1 0 - 2 %    ABS. NEUTROPHILS 3.1 1.8 - 8.0 K/UL    ABS. LYMPHOCYTES 1.9 0.9 - 3.6 K/UL    ABS. MONOCYTES 0.6 0.05 - 1.2 K/UL    ABS. EOSINOPHILS 0.4 0.0 - 0.4 K/UL    ABS. BASOPHILS 0.1 0.0 - 0.1 K/UL    DF AUTOMATED     TSH 3RD GENERATION   Result Value Ref Range    TSH 2.60 0.36 - 3.74 uIU/mL   HEMOGLOBIN A1C WITH EAG   Result Value Ref Range    Hemoglobin A1c 5.6 4.2 - 5.6 %    Est. average glucose 114 mg/dL     No visits with results within 3 Month(s) from this visit. Latest known visit with results is:   Hospital Outpatient Visit on 09/20/2018   Component Date Value Ref Range Status    Uric acid 09/20/2018 5.3  2.6 - 7.2 MG/DL Final    Comment: The drugs N-acetylcysteine (NAC) and  Metamiszole have been found to cause falsely  low results in this chemical assay. Please  be sure to submit blood samples obtained  BEFORE administration of either of these  drugs to assure correct results.       Sodium 09/20/2018 141  136 - 145 mmol/L Final    Potassium 09/20/2018 4.2  3.5 - 5.5 mmol/L Final    Chloride 09/20/2018 106  100 - 108 mmol/L Final    CO2 09/20/2018 24  21 - 32 mmol/L Final    Anion gap 09/20/2018 11  3.0 - 18 mmol/L Final    Glucose 09/20/2018 104* 74 - 99 mg/dL Final    BUN 09/20/2018 20* 7.0 - 18 MG/DL Final    Creatinine 09/20/2018 1.28  0.6 - 1.3 MG/DL Final    BUN/Creatinine ratio 09/20/2018 16  12 - 20   Final    GFR est AA 09/20/2018 >60  >60 ml/min/1.73m2 Final    GFR est non-AA 09/20/2018 >60  >60 ml/min/1.73m2 Final    Comment: (NOTE)  Estimated GFR is calculated using the Modification of Diet in Renal   Disease (MDRD) Study equation, reported for both  Americans   (GFRAA) and non- Americans (GFRNA), and normalized to 1.73m2   body surface area. The physician must decide which value applies to   the patient. The MDRD study equation should only be used in   individuals age 25 or older. It has not been validated for the   following: pregnant women, patients with serious comorbid conditions,   or on certain medications, or persons with extremes of body size,   muscle mass, or nutritional status.  Calcium 09/20/2018 9.2  8.5 - 10.1 MG/DL Final    Bilirubin, total 09/20/2018 0.6  0.2 - 1.0 MG/DL Final    ALT (SGPT) 09/20/2018 81* 16 - 61 U/L Final    AST (SGOT) 09/20/2018 34  15 - 37 U/L Final    Alk. phosphatase 09/20/2018 112  45 - 117 U/L Final    Protein, total 09/20/2018 8.1  6.4 - 8.2 g/dL Final    Albumin 09/20/2018 4.2  3.4 - 5.0 g/dL Final    Globulin 09/20/2018 3.9  2.0 - 4.0 g/dL Final    A-G Ratio 09/20/2018 1.1  0.8 - 1.7   Final    LIPID PROFILE 09/20/2018        Final    Cholesterol, total 09/20/2018 246* <200 MG/DL Final    Triglyceride 09/20/2018 245* <150 MG/DL Final    Comment: The drugs N-acetylcysteine (NAC) and  Metamiszole have been found to cause falsely  low results in this chemical assay. Please  be sure to submit blood samples obtained  BEFORE administration of either of these  drugs to assure correct results.       HDL Cholesterol 09/20/2018 41  40 - 60 MG/DL Final    LDL, calculated 09/20/2018 156* 0 - 100 MG/DL Final    VLDL, calculated 09/20/2018 49  MG/DL Final    CHOL/HDL Ratio 09/20/2018 6.0* 0 - 5.0   Final    WBC 09/20/2018 6.0  4.6 - 13.2 K/uL Final    RBC 09/20/2018 5.54* 4.70 - 5.50 M/uL Final    HGB 09/20/2018 18.2* 13.0 - 16.0 g/dL Final    HCT 09/20/2018 54.2* 36.0 - 48.0 % Final    MCV 09/20/2018 97.8* 74.0 - 97.0 FL Final    MCH 09/20/2018 32.9  24.0 - 34.0 PG Final    MCHC 09/20/2018 33.6  31.0 - 37.0 g/dL Final    RDW 09/20/2018 14.6* 11.6 - 14.5 % Final    PLATELET 52/66/8595 014  135 - 420 K/uL Final    MPV 09/20/2018 10.4 9.2 - 11.8 FL Final    NEUTROPHILS 09/20/2018 51  40 - 73 % Final    LYMPHOCYTES 09/20/2018 31  21 - 52 % Final    MONOCYTES 09/20/2018 10  3 - 10 % Final    EOSINOPHILS 09/20/2018 7* 0 - 5 % Final    BASOPHILS 09/20/2018 1  0 - 2 % Final    ABS. NEUTROPHILS 09/20/2018 3.1  1.8 - 8.0 K/UL Final    ABS. LYMPHOCYTES 09/20/2018 1.9  0.9 - 3.6 K/UL Final    ABS. MONOCYTES 09/20/2018 0.6  0.05 - 1.2 K/UL Final    ABS. EOSINOPHILS 09/20/2018 0.4  0.0 - 0.4 K/UL Final    ABS. BASOPHILS 09/20/2018 0.1  0.0 - 0.1 K/UL Final    DF 09/20/2018 AUTOMATED    Final    TSH 09/20/2018 2.60  0.36 - 3.74 uIU/mL Final    Hemoglobin A1c 09/20/2018 5.6  4.2 - 5.6 % Final    Comment: (NOTE)  HbA1C Interpretive Ranges  <5.7              Normal  5.7 - 6.4         Consider Prediabetes  >6.5              Consider Diabetes      Est. average glucose 09/20/2018 114  mg/dL Final    Comment: (NOTE)  The eAG should be interpreted with patient characteristics in mind   since ethnicity, interindividual differences, red cell lifespan,   variation in rates of glycation, etc. may affect the validity of the   calculation. Follow-up Disposition:  Return in about 2 weeks (around 4/1/2019) for for labs and BP check .

## 2019-03-18 NOTE — PROGRESS NOTES
Karyle Chew is a 40 y.o. male presents to office for cholesterol problem    Medication list has been reviewed with Karyle Chew and updated as of today's date     Health Maintenance items with a due date reviewed with patient:  Health Maintenance Due   Topic Date Due    DTaP/Tdap/Td series (1 - Tdap) 01/31/1996

## 2019-03-19 ENCOUNTER — TELEPHONE (OUTPATIENT)
Dept: FAMILY MEDICINE CLINIC | Age: 44
End: 2019-03-19

## 2019-03-19 LAB
ANION GAP SERPL CALC-SCNC: 9 MMOL/L (ref 3–18)
BASOPHILS # BLD: 0.1 K/UL (ref 0–0.1)
BASOPHILS NFR BLD: 2 % (ref 0–2)
BUN SERPL-MCNC: 16 MG/DL (ref 7–18)
BUN/CREAT SERPL: 11 (ref 12–20)
CALCIUM SERPL-MCNC: 9 MG/DL (ref 8.5–10.1)
CHLORIDE SERPL-SCNC: 106 MMOL/L (ref 100–108)
CO2 SERPL-SCNC: 25 MMOL/L (ref 21–32)
CREAT SERPL-MCNC: 1.42 MG/DL (ref 0.6–1.3)
DIFFERENTIAL METHOD BLD: ABNORMAL
EOSINOPHIL # BLD: 0.3 K/UL (ref 0–0.4)
EOSINOPHIL NFR BLD: 6 % (ref 0–5)
ERYTHROCYTE [DISTWIDTH] IN BLOOD BY AUTOMATED COUNT: 15 % (ref 11.6–14.5)
GLUCOSE SERPL-MCNC: 106 MG/DL (ref 74–99)
HBA1C MFR BLD: 6.2 % (ref 4.2–5.6)
HCT VFR BLD AUTO: 55.8 % (ref 36–48)
HGB BLD-MCNC: 18.1 G/DL (ref 13–16)
LYMPHOCYTES # BLD: 1.7 K/UL (ref 0.9–3.6)
LYMPHOCYTES NFR BLD: 31 % (ref 21–52)
MCH RBC QN AUTO: 32.6 PG (ref 24–34)
MCHC RBC AUTO-ENTMCNC: 32.4 G/DL (ref 31–37)
MCV RBC AUTO: 100.5 FL (ref 74–97)
MONOCYTES # BLD: 0.5 K/UL (ref 0.05–1.2)
MONOCYTES NFR BLD: 9 % (ref 3–10)
NEUTS SEG # BLD: 2.9 K/UL (ref 1.8–8)
NEUTS SEG NFR BLD: 52 % (ref 40–73)
PLATELET # BLD AUTO: 178 K/UL (ref 135–420)
PMV BLD AUTO: 10.7 FL (ref 9.2–11.8)
POTASSIUM SERPL-SCNC: 4.1 MMOL/L (ref 3.5–5.5)
RBC # BLD AUTO: 5.55 M/UL (ref 4.7–5.5)
SODIUM SERPL-SCNC: 140 MMOL/L (ref 136–145)
WBC # BLD AUTO: 5.5 K/UL (ref 4.6–13.2)

## 2019-03-21 NOTE — PROGRESS NOTES
High hemoglobin 11 and hematocrit he will be referred to hematologist    Patient has increase in hemoglobin A1c at 6.2 still prediabetes but is increased from last time it was 5.6 elevated cholesterol and triglyceride    Options for treatment can be discussed in the follow-up visit

## 2019-03-22 ENCOUNTER — TELEPHONE (OUTPATIENT)
Dept: FAMILY MEDICINE CLINIC | Age: 44
End: 2019-03-22

## 2019-03-22 NOTE — TELEPHONE ENCOUNTER
----- Message from Stalin Mcnamara MD sent at 3/21/2019  6:21 PM EDT -----  High hemoglobin 11 and hematocrit he will be referred to hematologist    Patient has increase in hemoglobin A1c at 6.2 still prediabetes but is increased from last time it was 5.6 elevated cholesterol and triglyceride    Options for treatment can be discussed in the follow-up visit

## 2019-04-02 ENCOUNTER — OFFICE VISIT (OUTPATIENT)
Dept: FAMILY MEDICINE CLINIC | Age: 44
End: 2019-04-02

## 2019-04-02 VITALS
HEIGHT: 68 IN | OXYGEN SATURATION: 98 % | TEMPERATURE: 97.6 F | SYSTOLIC BLOOD PRESSURE: 153 MMHG | WEIGHT: 256 LBS | HEART RATE: 92 BPM | RESPIRATION RATE: 16 BRPM | DIASTOLIC BLOOD PRESSURE: 110 MMHG | BODY MASS INDEX: 38.8 KG/M2

## 2019-04-02 DIAGNOSIS — E66.01 SEVERE OBESITY WITH BODY MASS INDEX (BMI) OF 35.0 TO 39.9 WITH SERIOUS COMORBIDITY (HCC): ICD-10-CM

## 2019-04-02 DIAGNOSIS — R73.03 PREDIABETES: ICD-10-CM

## 2019-04-02 DIAGNOSIS — E78.2 MIXED HYPERLIPIDEMIA: ICD-10-CM

## 2019-04-02 DIAGNOSIS — I10 ESSENTIAL HYPERTENSION: Primary | ICD-10-CM

## 2019-04-02 RX ORDER — METFORMIN HYDROCHLORIDE 500 MG/1
500 TABLET ORAL 2 TIMES DAILY WITH MEALS
Qty: 60 TAB | Refills: 1 | Status: SHIPPED | OUTPATIENT
Start: 2019-04-02 | End: 2020-08-24

## 2019-04-02 NOTE — PROGRESS NOTES
Emelia Silva is a 40 y.o. male presents to office for htn Medication list has been reviewed with Emelia Silva and updated as of today's date Health Maintenance items with a due date reviewed with patient: 
Health Maintenance Due Topic Date Due  
 DTaP/Tdap/Td series (1 - Tdap) 01/31/1996

## 2019-04-02 NOTE — PROGRESS NOTES
Joanne Delgado Chief Complaint Patient presents with  Hypertension Vitals:  
 04/02/19 1055 BP: (!) 153/110 Pulse: 92 Resp: 16 Temp: 97.6 °F (36.4 °C) TempSrc: Oral  
SpO2: 98% Weight: 256 lb (116.1 kg) Height: 5' 8\" (1.727 m) PainSc:   0 - No pain HPI: Patient is here for follow-up on high blood pressure he does not have hypertension he is not on any medication, slightly higher this time is 153/110, no dizziness no chest pain or of breath. Patient is also following up on lab result which did show hyperlipidemia with elevated LDL at 142 that slight decrease from last year which was 152 Patient has prediabetes with hemoglobin A1c at 6.2 and that has been an increase from last year which was 5.6 Patient has been changing his lifestyle activity and he actually lost 5 pounds since last visit and today has an appointment with the dietitian. Past Medical History:  
Diagnosis Date  Gout  Hx of biopsy Past Surgical History:  
Procedure Laterality Date  NEUROLOGICAL PROCEDURE UNLISTED Social History Tobacco Use  Smoking status: Never Smoker  Smokeless tobacco: Never Used Substance Use Topics  Alcohol use: Yes Alcohol/week: 0.6 - 1.8 oz Types: 1 - 3 Cans of beer per week Family History Problem Relation Age of Onset  No Known Problems Mother  Depression Father  Bipolar Disorder Father Review of Systems Constitutional: Negative for chills, fever, malaise/fatigue and weight loss. HENT: Negative for congestion, ear discharge, ear pain, hearing loss, nosebleeds, sinus pain and sore throat. Eyes: Negative for blurred vision, double vision and discharge. Respiratory: Negative for cough, hemoptysis, sputum production, shortness of breath and wheezing. Cardiovascular: Negative for chest pain, palpitations, claudication and leg swelling.   
Gastrointestinal: Negative for abdominal pain, blood in stool, constipation, diarrhea, nausea and vomiting. Genitourinary: Negative for dysuria, frequency and urgency. Musculoskeletal: Negative for back pain, joint pain, myalgias and neck pain. Skin: Negative for itching and rash. Neurological: Negative for dizziness, tingling, sensory change, speech change, focal weakness, weakness and headaches. Psychiatric/Behavioral: Negative for depression and suicidal ideas. Physical Exam  
Constitutional: He is oriented to person, place, and time. He appears well-developed and well-nourished. No distress. HENT:  
Head: Normocephalic and atraumatic. Mouth/Throat: No oropharyngeal exudate. Eyes: Pupils are equal, round, and reactive to light. Conjunctivae are normal. Right eye exhibits no discharge. Left eye exhibits no discharge. No scleral icterus. Neck: Normal range of motion. Neck supple. No thyromegaly present. Cardiovascular: Normal rate, regular rhythm and normal heart sounds. Pulmonary/Chest: Effort normal and breath sounds normal. No respiratory distress. He has no rales. Abdominal: Soft. Musculoskeletal: Normal range of motion. He exhibits no edema, tenderness or deformity. Lymphadenopathy:  
  He has no cervical adenopathy. Neurological: He is alert and oriented to person, place, and time. No cranial nerve deficit. Coordination normal.  
Skin: Skin is warm and dry. No rash noted. He is not diaphoretic. No erythema. Psychiatric: He has a normal mood and affect. Judgment and thought content normal.  
Nursing note and vitals reviewed. Assessment and plan  
 
Plan of care has been discussed with the patient, he agrees to the plan and verbalized understanding. All his questions were answered More than 50% of the time spent in this visit was counseling the patient about  illness and treatment options 1. Prediabetes Patient is committed to lifestyle changes - metFORMIN (GLUCOPHAGE) 500 mg tablet; Take 1 Tab by mouth two (2) times daily (with meals). Dispense: 60 Tab; Refill: 1 
 
2. Mixed hyperlipidemia Patient is slightly reluctant to start statin at this point discussed with him the fact being prediabetes elevated high blood pressure will put him at more risk for coronary disease and stroke will assess his blood pressure and he has to go on high blood pressure medication he should be on statin as well 3. Essential hypertension Follow-up for nurse visit for her third blood pressure check 4. Severe obesity with body mass index (BMI) of 35.0 to 39.9 with serious comorbidity ([address] Already lost weight since last visit and he is committed to lifestyle modification Current Outpatient Medications Medication Sig Dispense Refill  metFORMIN (GLUCOPHAGE) 500 mg tablet Take 1 Tab by mouth two (2) times daily (with meals). 60 Tab 1  
 lamoTRIgine (LAMICTAL) 25 mg (35) DsPk  albuterol (PROVENTIL HFA, VENTOLIN HFA, PROAIR HFA) 90 mcg/actuation inhaler Take 2 Puffs by inhalation every six (6) hours as needed for Wheezing or Shortness of Breath. Indications: BRONCHOSPASM PREVENTION 1 Inhaler 3  
 allopurinol (ZYLOPRIM) 300 mg tablet Take  by mouth daily. Patient Active Problem List  
 Diagnosis Date Noted  Mixed hyperlipidemia 04/02/2019  Prediabetes 04/02/2019  Severe obesity with body mass index (BMI) of 35.0 to 39.9 with serious comorbidity ([facility] [address] 06/29/2018  Schizophrenia ([facility] [address] 01/26/2017  Idiopathic chronic gout of multiple sites with [facility] 09/12/2016 Results for orders placed or performed during the hospital encounter of 03/18/19 LIPID PANEL Result Value Ref Range LIPID PROFILE Cholesterol, total 258 (H) <200 MG/DL Triglyceride 389 (H) <150 MG/DL  
 HDL Cholesterol 38 (L) 40 - 60 MG/DL  
 LDL, calculated 142.2 (H) 0 - 100 MG/DL VLDL, calculated 77.8 MG/DL  
 CHOL/HDL Ratio 6.8 (H) 0 - 5.0 CBC WITH AUTOMATED DIFF Result Value Ref Range WBC 5.5 4.6 - 13.2 K/uL  
 RBC 5.55 (H) 4.70 - 5.50 M/uL  
 HGB 18.1 (H) 13.0 - 16.0 g/dL HCT 55.8 (HH) 36.0 - 48.0 % .5 (H) 74.0 - 97.0 FL  
 MCH 32.6 24.0 - 34.0 PG  
 MCHC 32.4 31.0 - 37.0 g/dL  
 RDW 15.0 (H) 11.6 - 14.5 % PLATELET 634 995 - 771 K/uL MPV 10.7 9.2 - 11.8 FL  
 NEUTROPHILS 52 40 - 73 % LYMPHOCYTES 31 21 - 52 % MONOCYTES 9 3 - 10 % EOSINOPHILS 6 (H) 0 - 5 % BASOPHILS 2 0 - 2 %  
 ABS. NEUTROPHILS 2.9 1.8 - 8.0 K/UL  
 ABS. LYMPHOCYTES 1.7 0.9 - 3.6 K/UL  
 ABS. MONOCYTES 0.5 0.05 - 1.2 K/UL  
 ABS. EOSINOPHILS 0.3 0.0 - 0.4 K/UL  
 ABS. BASOPHILS 0.1 0.0 - 0.1 K/UL  
 DF AUTOMATED METABOLIC PANEL, BASIC Result Value Ref Range Sodium 140 136 - 145 mmol/L Potassium 4.1 3.5 - 5.5 mmol/L Chloride 106 100 - 108 mmol/L  
 CO2 25 21 - 32 mmol/L Anion gap 9 3.0 - 18 mmol/L Glucose 106 (H) 74 - 99 mg/dL BUN 16 7.0 - 18 MG/DL Creatinine 1.42 (H) 0.6 - 1.3 MG/DL  
 BUN/Creatinine ratio 11 (L) 12 - 20 GFR est AA >60 >60 ml/min/1.73m2 GFR est non-AA 54 (L) >60 ml/min/1.73m2 Calcium 9.0 8.5 - 10.1 MG/DL  
HEMOGLOBIN A1C W/O EAG Result Value Ref Range Hemoglobin A1c 6.2 (H) 4.2 - 5.6 % Hospital Outpatient Visit on 03/18/2019 Component Date Value Ref Range Status  LIPID PROFILE 03/18/2019        Final  
 Cholesterol, total 03/18/2019 258* <200 MG/DL Final  
 Triglyceride 03/18/2019 389* <150 MG/DL Final  
 Comment: The drugs N-acetylcysteine (NAC) and 
Metamiszole have been found to cause falsely 
low results in this chemical assay. Please 
be sure to submit blood samples obtained BEFORE administration of either of these 
drugs to assure correct results.  
  
 HDL Cholesterol 03/18/2019 38* 40 - 60 MG/DL Final  
 LDL, calculated 03/18/2019 142.2* 0 - 100 MG/DL Final  
 VLDL, calculated 03/18/2019 77.8  MG/DL Final  
 CHOL/HDL Ratio 03/18/2019 6.8* 0 - 5.0   Final  
  WBC 03/18/2019 5.5  4.6 - 13.2 K/uL Final  
 RBC 03/18/2019 5.55* 4.70 - 5.50 M/uL Final  
 HGB 03/18/2019 18.1* 13.0 - 16.0 g/dL Final  
 HCT 03/18/2019 55.8* 36.0 - 48.0 % Final  
 Comment: FOLLOWING RESULTS VERIFIED BY REPETITION: 
CALLED TO AND CORRECTLY REPEATED BY: 
DR. Noé Santos ON 3/19/19 AT 0 
FOLLOWING RESULTS VERIFIED BY REPETITION: 
CALLED TO AND CORRECTLY REPEATED BY: 
DR ALVES ON 3/19/19 AT 0723 BY [facility] 
  
 MCV 03/18/2019 100.5* 74.0 - 97.0 FL Final  
 MCH 03/18/2019 32.6  24.0 - 34.0 PG Final  
 MCHC 03/18/2019 32.4  31.0 - 37.0 g/dL Final  
 RDW 03/18/2019 15.0* 11.6 - 14.5 % Final  
 PLATELET 70/94/8927 017  135 - 420 K/uL Final  
 MPV 03/18/2019 10.7  9.2 - 11.8 FL Final  
 NEUTROPHILS 03/18/2019 52  40 - 73 % Final  
 LYMPHOCYTES 03/18/2019 31  21 - 52 % Final  
 MONOCYTES 03/18/2019 9  3 - 10 % Final  
 EOSINOPHILS 03/18/2019 6* 0 - 5 % Final  
 BASOPHILS 03/18/2019 2  0 - 2 % Final  
 ABS. NEUTROPHILS 03/18/2019 2.9  1.8 - 8.0 K/UL Final  
 ABS. LYMPHOCYTES 03/18/2019 1.7  0.9 - 3.6 K/UL Final  
 ABS. MONOCYTES 03/18/2019 0.5  0.05 - 1.2 K/UL Final  
 ABS. EOSINOPHILS 03/18/2019 0.3  0.0 - 0.4 K/UL Final  
 ABS. BASOPHILS 03/18/2019 0.1  0.0 - 0.1 K/UL Final  
 DF 03/18/2019 AUTOMATED    Final  
 Sodium 03/18/2019 140  136 - 145 mmol/L Final  
 Potassium 03/18/2019 4.1  3.5 - 5.5 mmol/L Final  
 Chloride 03/18/2019 106  100 - 108 mmol/L Final  
 CO2 03/18/2019 25  21 - 32 mmol/L Final  
 Anion gap 03/18/2019 9  3.0 - 18 mmol/L Final  
 Glucose 03/18/2019 106* 74 - 99 mg/dL Final  
 BUN 03/18/2019 16  7.0 - 18 MG/DL Final  
 Creatinine 03/18/2019 1.42* 0.6 - 1.3 MG/DL Final  
 BUN/Creatinine ratio 03/18/2019 11* 12 - 20   Final  
 GFR est AA 03/18/2019 >60  >60 ml/min/1.73m2 Final  
 GFR est non-AA 03/18/2019 54* >60 ml/min/1.73m2 Final  
 Comment: (NOTE) Estimated GFR is calculated using the Modification of Diet in Renal  
 Disease (MDRD) Study equation, reported for both  [facility]) and non- Americans (GFRNA), and normalized to 1.73m2  
body surface area. The physician must decide which value applies to  
the patient. The MDRD study equation should only be used in  
individuals age 25 or older. It has not been validated for the  
following: pregnant women, patients with serious comorbid conditions,  
or on certain medications, or persons with extremes of body size,  
muscle mass, or nutritional status.  Calcium 03/18/2019 9.0  8.5 - 10.1 MG/DL Final  
 Hemoglobin A1c 03/18/2019 6.2* 4.2 - 5.6 % Final  
 Comment: (NOTE) HbA1C Interpretive Ranges <5.7              Normal 
5.7 - 6.4         Consider Prediabetes >6.5              Consider Diabetes Follow-up and Dispositions · Return in about 1 month (around 5/2/2019) for nurse vist for BP in 1 week .

## 2019-04-02 NOTE — PATIENT INSTRUCTIONS
Low Sodium Diet (2,000 Milligram): Care Instructions Your Care Instructions Too much sodium causes your body to hold on to extra water. This can raise your blood pressure and force your heart and kidneys to work harder. In very serious cases, this could cause you to be put in the hospital. It might even be life-threatening. By limiting sodium, you will feel better and lower your risk of serious problems. The most common source of sodium is salt. People get most of the salt in their diet from canned, prepared, and packaged foods. Fast food and restaurant meals also are very high in sodium. Your doctor will probably limit your sodium to less than 2,000 milligrams (mg) a day. This limit counts all the sodium in prepared and packaged foods and any salt you add to your food. Follow-up care is a key part of your treatment and safety. Be sure to make and go to all appointments, and call your doctor if you are having problems. It's also a good idea to know your test results and keep a list of the medicines you take. How can you care for yourself at home? Read food labels · Read labels on cans and food packages. The labels tell you how much sodium is in each serving. Make sure that you look at the serving size. If you eat more than the serving size, you have eaten more sodium. · Food labels also tell you the Percent Daily Value for sodium. Choose products with low Percent Daily Values for sodium. · Be aware that sodium can come in forms other than salt, including monosodium glutamate (MSG), sodium citrate, and sodium bicarbonate (baking soda). MSG is often added to Asian food. When you eat out, you can sometimes ask for food without MSG or added salt. Buy low-sodium foods · Buy foods that are labeled \"unsalted\" (no salt added), \"sodium-free\" (less than 5 mg of sodium per serving), or \"low-sodium\" (less than 140 mg of sodium per serving).  Foods labeled \"reduced-sodium\" and \"light sodium\" may still have too much sodium. Be sure to read the label to see how much sodium you are getting. · Buy fresh vegetables, or frozen vegetables without added sauces. Buy low-sodium versions of canned vegetables, soups, and other canned goods. Prepare low-sodium meals · Cut back on the amount of salt you use in cooking. This will help you adjust to the taste. Do not add salt after cooking. One teaspoon of salt has about 2,300 mg of sodium. · Take the salt shaker off the table. · Flavor your food with garlic, lemon juice, onion, vinegar, herbs, and spices. Do not use soy sauce, lite soy sauce, steak sauce, onion salt, garlic salt, celery salt, mustard, or ketchup on your food. · Use low-sodium salad dressings, sauces, and ketchup. Or make your own salad dressings and sauces without adding salt. · Use less salt (or none) when recipes call for it. You can often use half the salt a recipe calls for without losing flavor. Other foods such as rice, pasta, and grains do not need added salt. · Rinse canned vegetables, and cook them in fresh water. This removes somebut not allof the salt. · Avoid water that is naturally high in sodium or that has been treated with water softeners, which add sodium. Call your local water company to find out the sodium content of your water supply. If you buy bottled water, read the label and choose a sodium-free brand. Avoid high-sodium foods · Avoid eating: 
? Smoked, cured, salted, and canned meat, fish, and poultry. ? Ham, harden, hot dogs, and luncheon meats. ? Regular, hard, and processed cheese and regular peanut butter. ? Crackers with salted tops, and other salted snack foods such as pretzels, chips, and salted popcorn. ? Frozen prepared meals, unless labeled low-sodium. ? Canned and dried soups, broths, and bouillon, unless labeled sodium-free or low-sodium. ? Canned vegetables, unless labeled sodium-free or low-sodium. ? [place] fries, pizza, tacos, and other fast foods. ? Pickles, olives, ketchup, and other condiments, especially soy sauce, unless labeled sodium-free or low-sodium. Where can you learn more? Go to http://fer-devin.info/. Enter I409 in the search box to learn more about \"Low Sodium Diet (2,000 Milligram): Care Instructions. \" Current as of: March 28, 2018 Content Version: 11.9 © 3424-8023 [employer]. Care instructions adapted under license by [employer] (which disclaims liability or warranty for this information). If you have questions about a medical condition or this instruction, always ask your healthcare professional. [address] any warranty or liability for your use of this information. How to Read a Food Label to Limit Sodium: Care Instructions Your Care Instructions Sodium causes your body to hold on to extra water. This can raise your blood pressure and force your heart and kidneys to work harder. In very serious cases, this could cause you to be put in the hospital. It might even be life-threatening. By limiting sodium, you will feel better and lower your risk of serious problems. Processed foods, fast food, and restaurant foods are the major sources of dietary sodium. The most common name for sodium is salt. Try to limit how much sodium you eat to less than 2,300 milligrams (mg) a day. If you limit your sodium to 1,500 mg a day, you can lower your blood pressure even more. This limit counts all the salt that you eat in foods you cook or in packaged foods. Keep a list of everything you eat and drink. Follow-up care is a key part of your treatment and safety. Be sure to make and go to all appointments, and call your doctor if you are having problems. It's also a good idea to know your test results and keep a list of the medicines you take. How can you care for yourself at home? Read ingredient lists on food labels · Read the list of ingredients on food labels to help you find how much sodium is in a food. The label lists the ingredients in a food in descending order (from the most to the least). If salt or sodium is high on the list, there may be a lot of sodium in the food. · Know that sodium has different names. Sodium is also called monosodium glutamate (MSG, common in [facility] food), sodium citrate, sodium alginate, sodium hydroxide, and sodium phosphate. Read Nutrition Facts labels · On most foods, there is a Nutrition Facts label. This will tell you how much sodium is in one serving of food. Look at both the serving size and the sodium amount. The serving size is located at the top of the label, usually right under the \"Nutrition Facts\" title. The amount of sodium is given in the list under the title. It is given in milligrams (mg). ? Check the serving size carefully. A single serving is often very small, and you may eat more than one serving. If this is the case, you will eat more sodium than listed on the label. For example, if the serving size for a canned soup is 1 cup and the sodium amount is 470 mg, if you have 2 cups you will eat 940 mg of sodium. · The nutrition facts for fresh fruits and vegetables are not listed on the food. They may be listed somewhere in the store. These foods usually have no sodium or low sodium. · The Nutrition Facts label also gives you the Percent Daily Value for sodium. This is how much of the recommended amount of sodium a serving contains. The daily value for sodium is less than 2,300 mg. So if the Percent Daily Value says 50%, this means one serving is giving you half of this, or 1,150 mg. Buy low-sodium foods · Look for foods that are made with less sodium. Watch for the following words on the label. ? \"Unsalted\" means there is no sodium added to the food. But there may be sodium already in the food naturally. ? \"Sodium-free\" means a serving has less than 5 mg of sodium. ? \"Very low sodium\" means a serving has 35 mg or less of sodium. ? \"Low-sodium\" means a serving has 140 mg or less of sodium. · \"Reduced-sodium\" means that there is 25% less sodium than what the food normally has. This is still usually too much sodium. Try not to buy foods with this on the label. · Buy fresh vegetables, or frozen vegetables without added sauces. Buy low-sodium versions of canned vegetables, soups, and other canned goods. Where can you learn more? Go to http://ferCelestial Semiconductordevin.info/. Enter 26 287210 in the search box to learn more about \"How to Read a Food Label to Limit Sodium: Care Instructions. \" Current as of: March 28, 2018 Content Version: 11.9 © 8616-5908 [employer]. Care instructions adapted under license by [employer] (which disclaims liability or warranty for this information). If you have questions about a medical condition or this instruction, always ask your healthcare professional. [place] [address] any warranty or liability for your use of this information.

## 2019-09-25 ENCOUNTER — OFFICE VISIT (OUTPATIENT)
Dept: ONCOLOGY | Age: 44
End: 2019-09-25

## 2019-09-25 VITALS
OXYGEN SATURATION: 94 % | SYSTOLIC BLOOD PRESSURE: 123 MMHG | BODY MASS INDEX: 32.99 KG/M2 | RESPIRATION RATE: 18 BRPM | WEIGHT: 217 LBS | HEART RATE: 81 BPM | DIASTOLIC BLOOD PRESSURE: 86 MMHG

## 2019-09-25 DIAGNOSIS — D75.89 MACROCYTOSIS: ICD-10-CM

## 2019-09-25 DIAGNOSIS — D75.1 POLYCYTHEMIA: Primary | ICD-10-CM

## 2019-09-25 RX ORDER — COLCHICINE 0.6 MG/1
TABLET ORAL
Refills: 1 | COMMUNITY
Start: 2019-09-10

## 2019-09-25 RX ORDER — LAMOTRIGINE 100 MG/1
TABLET ORAL
COMMUNITY
Start: 2019-08-21

## 2019-09-25 RX ORDER — ARIPIPRAZOLE 300 MG
KIT INTRAMUSCULAR
COMMUNITY
Start: 2019-09-11

## 2019-09-25 NOTE — PROGRESS NOTES
[facility]  [id number] [place] [address], [address]  [place], [facility]  Office Phone: [phone]  Fax: [phone]      Reason for visit:  Abnormal Lab Results (Elevated hemoglobin/hematocrit)      HPI:   Liliana Resendiz is a 40 y.o.  male who I was asked to see in consultation at the request of Dr. Dmitri Johnson for evaluation for polycythemia. On 3/18/2019, labs showed BUN 16, creatinine 1.42, WBC 5.5, H&H 18.1/56, platelet 296. .5. Never smoked cigarette before. Never been diagnosed with lung disease  No daytime sleepiness  Has been using testosterone boosting    Patient was seen and examined today. On review of systems today he denies any fevers, chills, shortness of breath, nausea or abdominal pain. Denies any pruritus. Denies any history of blood clot. No headaches or visual changes. No focal neurologic deficit. No history of bleeding. No melena or bright red blood per rectum. ECOG performance status 0. Independent with ADLs and IADLs. DX:Polycythemia    Past Medical History:   Diagnosis Date    Gout     Hx of biopsy      Past Surgical History:   Procedure Laterality Date    NEUROLOGICAL PROCEDURE UNLISTED       Social History     Socioeconomic History    Marital status: SINGLE     Spouse name: Not on file    Number of children: Not on file    Years of education: Not on file    Highest education level: Not on file   Tobacco Use    Smoking status: Former Smoker     Last attempt to quit: 2018     Years since quittin.7    Smokeless tobacco: Never Used    Tobacco comment: Hookah   Substance and Sexual Activity    Alcohol use:  Yes     Alcohol/week: 1.0 - 3.0 standard drinks     Types: 1 - 3 Cans of beer per week    Drug use: Not Currently     Types: Marijuana     Comment: Previously used    Sexual activity: Yes     Partners: Female     Family History   Problem Relation Age of Onset    No Known Problems Mother     Depression Father    Bert Richards Bipolar Disorder Father        Current Outpatient Medications   Medication Sig Dispense Refill    ABILIFY MAINTENA 300 mg sers injection syringe       colchicine 0.6 mg tablet TAKE 2 TABLETS BY MOUTH AT FIRST SIGN OF FLARE. CAN TAKE 1 ADDITIONAL TABLET 1 HOUR LATER  1    lamoTRIgine (LAMICTAL) 100 mg tablet       metFORMIN (GLUCOPHAGE) 500 mg tablet Take 1 Tab by mouth two (2) times daily (with meals). 60 Tab 1    albuterol (PROVENTIL HFA, VENTOLIN HFA, PROAIR HFA) 90 mcg/actuation inhaler Take 2 Puffs by inhalation every six (6) hours as needed for Wheezing or Shortness of Breath. Indications: BRONCHOSPASM PREVENTION 1 Inhaler 3    allopurinol (ZYLOPRIM) 300 mg tablet Take  by mouth daily. No Known Allergies    Review of Systems    On review of systems today he denies any fevers, chills, shortness of breath, nausea or abdominal pain. Denies any pruritus. Denies any history of blood clot. No headaches or visual changes. No focal neurologic deficit. No history of bleeding. No melena or bright red blood per rectum. ECOG performance status 0. Independent with ADLs and IADLs. Objective:  Physical Exam:  Visit Vitals  /86   Pulse 81   Resp 18   Wt 98.4 kg (217 lb)   SpO2 94%   BMI 32.99 kg/m²       General:  Alert, cooperative, no distress, appears stated age. Head:  Normocephalic, without obvious abnormality, atraumatic. Eyes:  Conjunctivae/corneas clear. PERRL, EOMs intact. Throat: Lips, mucosa, and tongue normal.    Neck: Supple, symmetrical, trachea midline, no adenopathy, thyroid: no enlargement/tenderness/nodules   Back:   Symmetric, no curvature. ROM normal. No CVA tenderness. Lungs:   Clear to auscultation bilaterally. Chest wall:  No tenderness or deformity. Heart:  Regular rate and rhythm, S1, S2 normal, no murmur, click, rub or gallop. Abdomen:   Soft, non-tender. Bowel sounds normal. No masses,  No organomegaly.    Extremities: Extremities normal, atraumatic, no cyanosis or edema. Skin: Skin color, texture, turgor normal. No rashes or lesions. Lymph nodes: Cervical, supraclavicular, and axillary nodes normal.   Neurologic: CNII-XII intact. Diagnostic Imaging     No results found for this or any previous visit. Lab Results  Lab Results   Component Value Date/Time    WBC 5.5 03/18/2019 08:55 AM    HGB 18.1 (H) 03/18/2019 08:55 AM    HCT 55.8 (HH) 03/18/2019 08:55 AM    PLATELET 906 40/56/5692 08:55 AM    .5 (H) 03/18/2019 08:55 AM       Lab Results   Component Value Date/Time    Sodium 140 03/18/2019 08:55 AM    Potassium 4.1 03/18/2019 08:55 AM    Chloride 106 03/18/2019 08:55 AM    CO2 25 03/18/2019 08:55 AM    Anion gap 9 03/18/2019 08:55 AM    Glucose 106 (H) 03/18/2019 08:55 AM    BUN 16 03/18/2019 08:55 AM    Creatinine 1.42 (H) 03/18/2019 08:55 AM    BUN/Creatinine ratio 11 (L) 03/18/2019 08:55 AM    GFR est AA >60 03/18/2019 08:55 AM    GFR est non-AA 54 (L) 03/18/2019 08:55 AM    Calcium 9.0 03/18/2019 08:55 AM    AST (SGOT) 34 09/20/2018 09:22 AM    Alk. phosphatase 112 09/20/2018 09:22 AM    Protein, total 8.1 09/20/2018 09:22 AM    Albumin 4.2 09/20/2018 09:22 AM    Globulin 3.9 09/20/2018 09:22 AM    A-G Ratio 1.1 09/20/2018 09:22 AM    ALT (SGPT) 81 (H) 09/20/2018 09:22 AM   Follow-up with PCP for health maintenance. Assessment/Plan:  40 y.o. male with  1. Polycythemia  Had a long discussion with patient regarding his polycythemia. This is likely secondary to testosterone supplement he is taking for muscle building. Plan is:  *Stop testosterone supplement. *Erythropoietin level  *CBC with differential, CMP, B12 and folate  *Ultrasound of the abdomen to rule out any abdominal mass  *JAK2, MPL and CALR  *Sleep studies later  *Testosterone level    2. Macrocytosis  *This may be secondary to B12 deficiency from metformin.   *Checking B12 and folate    RTc one month

## 2019-09-25 NOTE — PROGRESS NOTES
Imelda Nolasco is a 40 y.o. male presenting today for a new patient appointment. Patient is ambulatory with no assistive devices and denies any complaints. Chief Complaint   Patient presents with    Abnormal Lab Results     Elevated hemoglobin/hematocrit       Visit Vitals  /86   Pulse 81   Resp 18   Wt 217 lb (98.4 kg)   SpO2 94%   BMI 32.99 kg/m²       Current Outpatient Medications   Medication Sig    ABILIFY MAINTENA 300 mg sers injection syringe     colchicine 0.6 mg tablet TAKE 2 TABLETS BY MOUTH AT FIRST SIGN OF FLARE. CAN TAKE 1 ADDITIONAL TABLET 1 HOUR LATER    lamoTRIgine (LAMICTAL) 100 mg tablet     metFORMIN (GLUCOPHAGE) 500 mg tablet Take 1 Tab by mouth two (2) times daily (with meals).  albuterol (PROVENTIL HFA, VENTOLIN HFA, PROAIR HFA) 90 mcg/actuation inhaler Take 2 Puffs by inhalation every six (6) hours as needed for Wheezing or Shortness of Breath. Indications: BRONCHOSPASM PREVENTION    allopurinol (ZYLOPRIM) 300 mg tablet Take  by mouth daily. No current facility-administered medications for this visit. Medications no longer taking/discontinued: none    Fall Risk Assessment, last 12 mths 3/18/2019   Able to walk? Yes   Fall in past 12 months? No       3 most recent PHQ Screens 11/20/2018   Little interest or pleasure in doing things Several days   Feeling down, depressed, irritable, or hopeless Not at all   Total Score PHQ 2 1       Abuse Screening Questionnaire 3/18/2019   Do you ever feel afraid of your partner? N   Are you in a relationship with someone who physically or mentally threatens you? N   Is it safe for you to go home?  Y       Learning Assessment 3/18/2019   PRIMARY LEARNER Patient   PRIMARY LANGUAGE ENGLISH   LEARNER PREFERENCE PRIMARY PICTURES     LISTENING     VIDEOS     DEMONSTRATION   ANSWERED BY patient   RELATIONSHIP SELF

## 2019-10-08 ENCOUNTER — TELEPHONE (OUTPATIENT)
Dept: ONCOLOGY | Age: 44
End: 2019-10-08

## 2019-10-21 ENCOUNTER — HOSPITAL ENCOUNTER (OUTPATIENT)
Dept: INFUSION THERAPY | Age: 44
Discharge: HOME | End: 2019-10-21
Payer: MEDICARE

## 2019-10-21 VITALS
DIASTOLIC BLOOD PRESSURE: 86 MMHG | SYSTOLIC BLOOD PRESSURE: 123 MMHG | OXYGEN SATURATION: 96 % | HEART RATE: 81 BPM | TEMPERATURE: 97.5 F

## 2019-10-21 DIAGNOSIS — D75.89 MACROCYTOSIS: ICD-10-CM

## 2019-10-21 DIAGNOSIS — D75.1 POLYCYTHEMIA: ICD-10-CM

## 2019-10-21 LAB
ALBUMIN SERPL-MCNC: 3.9 G/DL (ref 3.4–5)
ALBUMIN/GLOB SERPL: 1.1 {RATIO} (ref 0.8–1.7)
ALP SERPL-CCNC: 103 U/L (ref 45–117)
ALT SERPL-CCNC: 76 U/L (ref 16–61)
ANION GAP SERPL CALC-SCNC: 6 MMOL/L (ref 3–18)
AST SERPL-CCNC: 33 U/L (ref 10–38)
BASOPHILS # BLD: 0.1 K/UL (ref 0–0.1)
BASOPHILS NFR BLD: 2 % (ref 0–2)
BILIRUB SERPL-MCNC: 0.4 MG/DL (ref 0.2–1)
BUN SERPL-MCNC: 18 MG/DL (ref 7–18)
BUN/CREAT SERPL: 14 (ref 12–20)
CALCIUM SERPL-MCNC: 9.1 MG/DL (ref 8.5–10.1)
CHLORIDE SERPL-SCNC: 107 MMOL/L (ref 100–111)
CO2 SERPL-SCNC: 28 MMOL/L (ref 21–32)
CREAT SERPL-MCNC: 1.29 MG/DL (ref 0.6–1.3)
DIFFERENTIAL METHOD BLD: ABNORMAL
EOSINOPHIL # BLD: 0.6 K/UL (ref 0–0.4)
EOSINOPHIL NFR BLD: 10 % (ref 0–5)
ERYTHROCYTE [DISTWIDTH] IN BLOOD BY AUTOMATED COUNT: 13.9 % (ref 11.6–14.5)
FOLATE SERPL-MCNC: 19.9 NG/ML (ref 3.1–17.5)
GLOBULIN SER CALC-MCNC: 3.7 G/DL (ref 2–4)
GLUCOSE SERPL-MCNC: 98 MG/DL (ref 74–99)
HCT VFR BLD AUTO: 51 % (ref 36–48)
HGB BLD-MCNC: 17.3 G/DL (ref 13–16)
LYMPHOCYTES # BLD: 1.6 K/UL (ref 0.9–3.6)
LYMPHOCYTES NFR BLD: 27 % (ref 21–52)
MCH RBC QN AUTO: 32.8 PG (ref 24–34)
MCHC RBC AUTO-ENTMCNC: 33.9 G/DL (ref 31–37)
MCV RBC AUTO: 96.6 FL (ref 74–97)
MONOCYTES # BLD: 0.4 K/UL (ref 0.05–1.2)
MONOCYTES NFR BLD: 8 % (ref 3–10)
NEUTS SEG # BLD: 3.1 K/UL (ref 1.8–8)
NEUTS SEG NFR BLD: 53 % (ref 40–73)
PLATELET # BLD AUTO: 172 K/UL (ref 135–420)
PMV BLD AUTO: 10.4 FL (ref 9.2–11.8)
POTASSIUM SERPL-SCNC: 4.1 MMOL/L (ref 3.5–5.5)
PROT SERPL-MCNC: 7.6 G/DL (ref 6.4–8.2)
RBC # BLD AUTO: 5.28 M/UL (ref 4.7–5.5)
SODIUM SERPL-SCNC: 141 MMOL/L (ref 136–145)
VIT B12 SERPL-MCNC: 711 PG/ML (ref 211–911)
WBC # BLD AUTO: 5.8 K/UL (ref 4.6–13.2)

## 2019-10-21 PROCEDURE — 85025 COMPLETE CBC W/AUTO DIFF WBC: CPT

## 2019-10-21 PROCEDURE — 80053 COMPREHEN METABOLIC PANEL: CPT

## 2019-10-21 PROCEDURE — 82607 VITAMIN B-12: CPT

## 2019-10-21 PROCEDURE — 36415 COLL VENOUS BLD VENIPUNCTURE: CPT

## 2019-10-21 PROCEDURE — 82668 ASSAY OF ERYTHROPOIETIN: CPT

## 2019-10-21 PROCEDURE — 84403 ASSAY OF TOTAL TESTOSTERONE: CPT

## 2019-10-21 NOTE — PROGRESS NOTES
[facility] Progress Note    Date: 2019    Name: Sharri Carrera    MRN: [id number]         : 1975    Peripheral Lab Draw      Mr. Dao to [facility], ambulatory at 1030 accompanied by self. Pt was assessed and education was provided. Mr. Dao's vitals were reviewed and patient was observed for 5 minutes prior to treatment. Visit Vitals  /86 (BP 1 Location: Left arm, BP Patient Position: Sitting)   Pulse 81   Temp 97.5 °F (36.4 °C)   SpO2 96%     Recent Results (from the past 12 hour(s))   METABOLIC PANEL, COMPREHENSIVE    Collection Time: 10/21/19 10:39 AM   Result Value Ref Range    Sodium 141 136 - 145 mmol/L    Potassium 4.1 3.5 - 5.5 mmol/L    Chloride 107 100 - 111 mmol/L    CO2 28 21 - 32 mmol/L    Anion gap 6 3.0 - 18 mmol/L    Glucose 98 74 - 99 mg/dL    BUN 18 7.0 - 18 MG/DL    Creatinine 1.29 0.6 - 1.3 MG/DL    BUN/Creatinine ratio 14 12 - 20      GFR est AA >60 >60 ml/min/1.73m2    GFR est non-AA >60 >60 ml/min/1.73m2    Calcium 9.1 8.5 - 10.1 MG/DL    Bilirubin, total 0.4 0.2 - 1.0 MG/DL    ALT (SGPT) 76 (H) 16 - 61 U/L    AST (SGOT) 33 10 - 38 U/L    Alk. phosphatase 103 45 - 117 U/L    Protein, total 7.6 6.4 - 8.2 g/dL    Albumin 3.9 3.4 - 5.0 g/dL    Globulin 3.7 2.0 - 4.0 g/dL    A-G Ratio 1.1 0.8 - 1.7     CBC WITH AUTOMATED DIFF    Collection Time: 10/21/19 10:39 AM   Result Value Ref Range    WBC 5.8 4.6 - 13.2 K/uL    RBC 5.28 4.70 - 5.50 M/uL    HGB 17.3 (H) 13.0 - 16.0 g/dL    HCT 51.0 (H) 36.0 - 48.0 %    MCV 96.6 74.0 - 97.0 FL    MCH 32.8 24.0 - 34.0 PG    MCHC 33.9 31.0 - 37.0 g/dL    RDW 13.9 11.6 - 14.5 %    PLATELET 274 311 - 617 K/uL    MPV 10.4 9.2 - 11.8 FL    NEUTROPHILS 53 40 - 73 %    LYMPHOCYTES 27 21 - 52 %    MONOCYTES 8 3 - 10 %    EOSINOPHILS 10 (H) 0 - 5 %    BASOPHILS 2 0 - 2 %    ABS. NEUTROPHILS 3.1 1.8 - 8.0 K/UL    ABS. LYMPHOCYTES 1.6 0.9 - 3.6 K/UL    ABS. MONOCYTES 0.4 0.05 - 1.2 K/UL    ABS. EOSINOPHILS 0.6 (H) 0.0 - 0.4 K/UL    ABS. BASOPHILS 0.1 0.0 - 0.1 K/UL    DF AUTOMATED         Blood obtained peripherally from left arm x 1 attempt with butterfly needle and sent to lab for Cbc w/diff Cmp, MPL and CALR Mutation, Testosterone, Total, Jak2 Mutation, Erythropoietin, Vitamin B12 and Folate per written orders. No bleeding or hematoma noted at site. Gauze and coban applied. Mr. Wilman Xie tolerated the phlebotomy, and had no complaints. Patient armband removed and shredded. Mr. Wilman Xie was discharged from [place] [address] in stable condition at [phone].      Min Kincaid Phlebotomist PCT  October 21, 2019  3:01 PM

## 2019-10-22 ENCOUNTER — TELEPHONE (OUTPATIENT)
Dept: ONCOLOGY | Age: 44
End: 2019-10-22

## 2019-10-22 LAB
EPO SERPL-ACNC: 15.9 MIU/ML (ref 2.6–18.5)
TESTOST SERPL-MCNC: 303 NG/DL (ref 264–916)

## 2019-10-24 LAB
BACKGROUND, CALR2T: NORMAL
BACKGROUND: 489207: NORMAL
CALR MUTATION DETECTION RESULT, CALR1T: NORMAL
DIRECTOR REVIEW: 489204: NORMAL
JAK2 P.V617F BLD/T QL: NORMAL
LAB DIRECTOR NAME PROVIDER: NORMAL
REF LAB TEST METHOD: NORMAL
REFERENCES, CALR4T: NORMAL

## 2019-12-11 LAB
LAB DIRECTOR NAME PROVIDER: NORMAL
MPL GENE MUT TESTED BLD/T: NORMAL
MPL P.W515L+W515K+S505N BLD/T QL: NORMAL
REFERENCES, MPLM6T: NORMAL
SERVICE CMNT-IMP: NORMAL

## 2020-08-24 ENCOUNTER — HOSPITAL ENCOUNTER (OUTPATIENT)
Dept: LAB | Age: 45
Discharge: HOME | End: 2020-08-24
Payer: MEDICARE

## 2020-08-24 ENCOUNTER — OFFICE VISIT (OUTPATIENT)
Dept: FAMILY MEDICINE CLINIC | Age: 45
End: 2020-08-24

## 2020-08-24 VITALS
SYSTOLIC BLOOD PRESSURE: 136 MMHG | OXYGEN SATURATION: 95 % | RESPIRATION RATE: 18 BRPM | BODY MASS INDEX: 34.89 KG/M2 | DIASTOLIC BLOOD PRESSURE: 94 MMHG | HEIGHT: 68 IN | TEMPERATURE: 97.8 F | WEIGHT: 230.2 LBS | HEART RATE: 101 BPM

## 2020-08-24 DIAGNOSIS — I10 ESSENTIAL HYPERTENSION: Primary | ICD-10-CM

## 2020-08-24 DIAGNOSIS — E55.9 VITAMIN D DEFICIENCY: ICD-10-CM

## 2020-08-24 DIAGNOSIS — R73.03 PREDIABETES: ICD-10-CM

## 2020-08-24 DIAGNOSIS — Z87.39 H/O ACUTE GOUTY ARTHRITIS: ICD-10-CM

## 2020-08-24 DIAGNOSIS — F20.9 SCHIZOPHRENIA, UNSPECIFIED TYPE (HCC): ICD-10-CM

## 2020-08-24 DIAGNOSIS — E66.9 OBESITY (BMI 35.0-39.9 WITHOUT COMORBIDITY): ICD-10-CM

## 2020-08-24 DIAGNOSIS — I10 ESSENTIAL HYPERTENSION: ICD-10-CM

## 2020-08-24 DIAGNOSIS — E78.2 MIXED HYPERLIPIDEMIA: ICD-10-CM

## 2020-08-24 DIAGNOSIS — Z12.5 PROSTATE CANCER SCREENING: ICD-10-CM

## 2020-08-24 LAB
ALBUMIN SERPL-MCNC: 4.2 G/DL (ref 3.4–5)
ALBUMIN/GLOB SERPL: 1.1 {RATIO} (ref 0.8–1.7)
ALP SERPL-CCNC: 110 U/L (ref 45–117)
ALT SERPL-CCNC: 59 U/L (ref 16–61)
ANION GAP SERPL CALC-SCNC: 6 MMOL/L (ref 3–18)
AST SERPL-CCNC: 35 U/L (ref 10–38)
BASOPHILS # BLD: 0.1 K/UL (ref 0–0.1)
BASOPHILS NFR BLD: 1 % (ref 0–2)
BILIRUB SERPL-MCNC: 0.8 MG/DL (ref 0.2–1)
BUN SERPL-MCNC: 16 MG/DL (ref 7–18)
BUN/CREAT SERPL: 13 (ref 12–20)
CALCIUM SERPL-MCNC: 9.6 MG/DL (ref 8.5–10.1)
CHLORIDE SERPL-SCNC: 106 MMOL/L (ref 100–111)
CHOLEST SERPL-MCNC: 241 MG/DL
CO2 SERPL-SCNC: 28 MMOL/L (ref 21–32)
CREAT SERPL-MCNC: 1.26 MG/DL (ref 0.6–1.3)
DIFFERENTIAL METHOD BLD: ABNORMAL
EOSINOPHIL # BLD: 0.3 K/UL (ref 0–0.4)
EOSINOPHIL NFR BLD: 4 % (ref 0–5)
ERYTHROCYTE [DISTWIDTH] IN BLOOD BY AUTOMATED COUNT: 16 % (ref 11.6–14.5)
GLOBULIN SER CALC-MCNC: 3.7 G/DL (ref 2–4)
GLUCOSE SERPL-MCNC: 88 MG/DL (ref 74–99)
HBA1C MFR BLD: 5.6 % (ref 4.2–5.6)
HCT VFR BLD AUTO: 53.2 % (ref 36–48)
HDLC SERPL-MCNC: 44 MG/DL (ref 40–60)
HDLC SERPL: 5.5 {RATIO} (ref 0–5)
HGB BLD-MCNC: 17.4 G/DL (ref 13–16)
LDLC SERPL CALC-MCNC: 120.2 MG/DL (ref 0–100)
LIPID PROFILE,FLP: ABNORMAL
LYMPHOCYTES # BLD: 2.2 K/UL (ref 0.9–3.6)
LYMPHOCYTES NFR BLD: 31 % (ref 21–52)
MCH RBC QN AUTO: 32.6 PG (ref 24–34)
MCHC RBC AUTO-ENTMCNC: 32.7 G/DL (ref 31–37)
MCV RBC AUTO: 99.6 FL (ref 74–97)
MONOCYTES # BLD: 0.7 K/UL (ref 0.05–1.2)
MONOCYTES NFR BLD: 10 % (ref 3–10)
NEUTS SEG # BLD: 3.8 K/UL (ref 1.8–8)
NEUTS SEG NFR BLD: 54 % (ref 40–73)
PLATELET # BLD AUTO: 193 K/UL (ref 135–420)
PMV BLD AUTO: 10.4 FL (ref 9.2–11.8)
POTASSIUM SERPL-SCNC: 3.9 MMOL/L (ref 3.5–5.5)
PROT SERPL-MCNC: 7.9 G/DL (ref 6.4–8.2)
RBC # BLD AUTO: 5.34 M/UL (ref 4.7–5.5)
SODIUM SERPL-SCNC: 140 MMOL/L (ref 136–145)
TRIGL SERPL-MCNC: 384 MG/DL (ref ?–150)
VLDLC SERPL CALC-MCNC: 76.8 MG/DL
WBC # BLD AUTO: 7 K/UL (ref 4.6–13.2)

## 2020-08-24 PROCEDURE — 80061 LIPID PANEL: CPT

## 2020-08-24 PROCEDURE — 85025 COMPLETE CBC W/AUTO DIFF WBC: CPT

## 2020-08-24 PROCEDURE — 83036 HEMOGLOBIN GLYCOSYLATED A1C: CPT

## 2020-08-24 PROCEDURE — 80053 COMPREHEN METABOLIC PANEL: CPT

## 2020-08-24 PROCEDURE — 36415 COLL VENOUS BLD VENIPUNCTURE: CPT

## 2020-08-24 NOTE — PROGRESS NOTES
Joann Au 89     Chief Complaint   Patient presents with    Follow-up     Vitals:    20 1007   BP: (!) 136/94   Pulse: (!) 101   Resp: 18   Temp: 97.8 °F (36.6 °C)   TempSrc: Oral   SpO2: 95%   Weight: 230 lb 3.2 oz (104.4 kg)   Height: 5' 8\" (1.727 m)   PainSc:   0 - No pain         HPI: Patient is here for follow-up, borderline blood sugar prediabetes was started on metformin patient stop medication did not follow-up, blood pressure is elevated he is not currently on any blood pressure medications, also last visit his cholesterol was elevated. Patient does exercise regularly but not following lifestyle modification and come to diet, he consumes sweets and sugars and carbohydrates he has increased 13 pounds since last visit,      Patient has schizophrenia has been stable he may [facility] at  [facility]     Past Medical History:   Diagnosis Date    Gout     Hx of biopsy      Past Surgical History:   Procedure Laterality Date    NEUROLOGICAL PROCEDURE UNLISTED       Social History     Tobacco Use    Smoking status: Former Smoker     Last attempt to quit: 2018     Years since quittin.6    Smokeless tobacco: Never Used    Tobacco comment: Hookah   Substance Use Topics    Alcohol use: Yes     Alcohol/week: 1.0 - 3.0 standard drinks     Types: 1 - 3 Cans of beer per week       Family History   Problem Relation Age of Onset    No Known Problems Mother     Depression Father     Bipolar Disorder Father        Review of Systems   Constitutional: Negative for chills, fever, malaise/fatigue and weight loss. HENT: Negative for congestion, ear discharge, ear pain, hearing loss and nosebleeds. Eyes: Negative for blurred vision, double vision and discharge. Respiratory: Negative for cough, hemoptysis, sputum production and wheezing. Cardiovascular: Negative for chest pain, palpitations, claudication and leg swelling.    Gastrointestinal: Negative for abdominal pain, constipation, diarrhea, nausea and vomiting. Genitourinary: Negative for dysuria, frequency and urgency. Musculoskeletal: Negative for back pain, joint pain, myalgias and neck pain. Skin: Negative for itching and rash. Neurological: Negative for dizziness, tingling, sensory change, speech change, focal weakness, weakness and headaches. Psychiatric/Behavioral: Negative for depression, hallucinations, substance abuse and suicidal ideas. The patient is not nervous/anxious. Physical Exam  Constitutional:       General: He is not in acute distress. Appearance: He is well-developed. He is not diaphoretic. HENT:      Head: Normocephalic and atraumatic. Mouth/Throat:      Pharynx: No oropharyngeal exudate. Eyes:      General: No scleral icterus. Right eye: No discharge. Left eye: No discharge. Conjunctiva/sclera: Conjunctivae normal.      Pupils: Pupils are equal, round, and reactive to light. Neck:      Musculoskeletal: Normal range of motion and neck supple. Thyroid: No thyromegaly. Cardiovascular:      Rate and Rhythm: Normal rate and regular rhythm. Heart sounds: Normal heart sounds. Pulmonary:      Effort: Pulmonary effort is normal. No respiratory distress. Breath sounds: Normal breath sounds. No rales. Abdominal:      General: Bowel sounds are normal. There is no distension. Palpations: Abdomen is soft. There is no mass. Tenderness: There is no abdominal tenderness. There is no rebound. Musculoskeletal: Normal range of motion. General: No tenderness or deformity. Lymphadenopathy:      Cervical: No cervical adenopathy. Skin:     General: Skin is warm and dry. Findings: No erythema or rash. Neurological:      Mental Status: He is alert and oriented to person, place, and time. Cranial Nerves: No cranial nerve deficit. Coordination: Coordination normal.   Psychiatric:         Thought Content:  Thought content normal. Judgment: Judgment normal.          Assessment and plan     Plan of care has been discussed with the patient, he agrees to the plan and verbalized understanding. All his questions were answered  More than 50% of the time spent in this visit was counseling the patient about  illness and treatment options         1. Essential hypertension  Consider starting blood pressure medication depending on his blood sugar results on cholesterol  - CBC WITH AUTOMATED DIFF; Future  - LIPID PANEL; Future  - LIPID PANEL  - METABOLIC PANEL, COMPREHENSIVE  - CBC WITH AUTOMATED DIFF  - METABOLIC PANEL, COMPREHENSIVE; Future    2. Mixed hyperlipidemia      - CBC WITH AUTOMATED DIFF; Future  - LIPID PANEL; Future  - LIPID PANEL  - CBC WITH AUTOMATED DIFF  - METABOLIC PANEL, COMPREHENSIVE; Future    3. Prediabetes  A1c was 6.2 last year  - CBC WITH AUTOMATED DIFF; Future  - LIPID PANEL; Future  - HEMOGLOBIN A1C W/O EAG; Future  - URINALYSIS W/ RFLX MICROSCOPIC  - HEMOGLOBIN A1C W/O EAG  - LIPID PANEL  - CBC WITH AUTOMATED DIFF  - METABOLIC PANEL, COMPREHENSIVE; Future    4. [facility]  Stable following up with Dr. Blu Brar     5. Obesity (BMI 35.0-39.9 without comorbidity)  Lifestyle modification has been discussed with patient in details, decrease carbohydrates, decrease or eliminate sugar intake, gradually increase physical activity as tolerated to be about 4 to 5 hours a week patient had declined dietitian referral today            7. Vitamin D deficiency  He is not on vitamin D supplement currently    Current Outpatient Medications   Medication Sig Dispense Refill    ABILIFY MAINTENA 300 mg sers injection syringe       colchicine 0.6 mg tablet TAKE 2 TABLETS BY MOUTH AT FIRST SIGN OF FLARE. CAN TAKE 1 ADDITIONAL TABLET 1 HOUR LATER  1    lamoTRIgine (LAMICTAL) 100 mg tablet       albuterol (PROVENTIL HFA, VENTOLIN HFA, PROAIR HFA) 90 mcg/actuation inhaler Take 2 Puffs by inhalation every six (6) hours as needed for Wheezing or Shortness of Breath. Indications: BRONCHOSPASM PREVENTION 1 Inhaler 3    allopurinol (ZYLOPRIM) 300 mg tablet Take  by mouth daily. 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     Results for orders placed or performed during the hospital encounter of 10/21/19   ERYTHROPOIETIN   Result Value Ref Range    Erythropoietin 15.9 2.6 - 18.5 mIU/mL   JAK2 MUTATION ANALYSIS   Result Value Ref Range    JAK2 Mutation Analysis Comment      Director Review Comment      Background Comment     CALR MUTATION ANALYSIS   Result Value Ref Range    CALR Mutation Detection Result Comment      Background Comment      Methodology Comment      References Comment      Director Review Comment     METABOLIC PANEL, COMPREHENSIVE   Result Value Ref Range    Sodium 141 136 - 145 mmol/L    Potassium 4.1 3.5 - 5.5 mmol/L    Chloride 107 100 - 111 mmol/L    CO2 28 21 - 32 mmol/L    Anion gap 6 3.0 - 18 mmol/L    Glucose 98 74 - 99 mg/dL    BUN 18 7.0 - 18 MG/DL    Creatinine 1.29 0.6 - 1.3 MG/DL    BUN/Creatinine ratio 14 12 - 20      GFR est AA >60 >60 ml/min/1.73m2    GFR est non-AA >60 >60 ml/min/1.73m2    Calcium 9.1 8.5 - 10.1 MG/DL    Bilirubin, total 0.4 0.2 - 1.0 MG/DL    ALT (SGPT) 76 (H) 16 - 61 U/L    AST (SGOT) 33 10 - 38 U/L    Alk.  phosphatase 103 45 - 117 U/L    Protein, total 7.6 6.4 - 8.2 g/dL    Albumin 3.9 3.4 - 5.0 g/dL    Globulin 3.7 2.0 - 4.0 g/dL    A-G Ratio 1.1 0.8 - 1.7     VITAMIN B12 & FOLATE   Result Value Ref Range    Vitamin B12 711 211 - 911 pg/mL    Folate 19.9 (H) 3.10 - 17.50 ng/mL   TESTOSTERONE, TOTAL, ADULT MALE   Result Value Ref Range    Testosterone 303 264 - 916 ng/dL CBC WITH AUTOMATED DIFF   Result Value Ref Range    WBC 5.8 4.6 - 13.2 K/uL    RBC 5.28 4.70 - 5.50 M/uL    HGB 17.3 (H) 13.0 - 16.0 g/dL    HCT 51.0 (H) 36.0 - 48.0 %    MCV 96.6 74.0 - 97.0 FL    MCH 32.8 24.0 - 34.0 PG    MCHC 33.9 31.0 - 37.0 g/dL    RDW 13.9 11.6 - 14.5 %    PLATELET 402 111 - 584 K/uL    MPV 10.4 9.2 - 11.8 FL    NEUTROPHILS 53 40 - 73 %    LYMPHOCYTES 27 21 - 52 %    MONOCYTES 8 3 - 10 %    EOSINOPHILS 10 (H) 0 - 5 %    BASOPHILS 2 0 - 2 %    ABS. NEUTROPHILS 3.1 1.8 - 8.0 K/UL    ABS. LYMPHOCYTES 1.6 0.9 - 3.6 K/UL    ABS. MONOCYTES 0.4 0.05 - 1.2 K/UL    ABS. EOSINOPHILS 0.6 (H) 0.0 - 0.4 K/UL    ABS. BASOPHILS 0.1 0.0 - 0.1 K/UL    DF AUTOMATED     MPL MUTATION ANALYSIS   Result Value Ref Range    MPL Mutation Analysis Result Comment      Background Comment      Methodology Comment      References Comment      Director Review Comment       No visits with results within 3 Month(s) from this visit. Latest known visit with results is:   Hospital Outpatient Visit on 10/21/2019   Component Date Value Ref Range Status    Erythropoietin 10/21/2019 15.9  2.6 - 18.5 mIU/mL Final    Comment: (NOTE)  [employer] [address] obtained with different assay methods or kits cannot be used  interchangeably. Results cannot be interpreted as absolute evidence  of the presence or absence of malignant disease. Performed At: [address] [id number]  Moo Henriquez MD :[id number]      Sunny Judith Mutation Analysis 10/21/2019 Comment    Final    Comment: (NOTE)  Result: NEGATIVE for the JAK2 V617F mutation. Interpretation:  The G to T nucleotide change encoding the V617F  mutation was not detected. This result does not rule out the  presence of the JAK2 mutation at a level below the sensitivity of  detection of this assay, or the presence of other mutations within  JAK2 not detected by this assay.   This result does not rule out a  diagnosis of polycythemia vera, essential thrombocythemia or  idiopathic myelofibrosis as the V617F mutation is not detected in all  patients with these disorders.  Director Review 10/21/2019 Comment    Final    Comment: (NOTE)  Nicolasa Bell, PhD, [facility]                Director, Carlos Cedeno for [address] [facility] and [address] [place] [address]                [phone]  This test was developed and its performance characteristics  determined by [employer]. It has not been cleared or approved  by the Food and Drug Administration. Performed At: [address] [id number]  Jessica Ellsworth MD CF:[id number]  Performed At: [facility]  [address] [place], [place] [id number]  Jessica Ellsworth MD RF:[id number]      Background 10/21/2019 Comment    Final    Comment: (NOTE)  JAK2 is a cytoplasmic tyrosine kinase with a key role in signal  transduction from multiple hematopoietic growth factor receptors. A  point mutation within exon 14 of the JAK2 gene ([id number]) encoding a  valine to phenylalanine substitution at position 617 of the JAK2  protein (V617F) has been identified in most patients with  polycythemia vera, and in about half of those with either essential  thrombocythemia or idiopathic myelofibrosis. The V617F has also been  detected, although infrequently, in other myeloid disorders such as  chronic myelomonocytic leukemia and chronic neutrophilic luekemia. V617F is an acquired mutation that alters a highly conserved valine  present in the negative regulatory JH2 domain of the JAK2 protein and  is predicted to dysregulate kinase activity. Methodology: Total genomic DNA was extracted and subjected to TaqMan real-time  PCR amplification/detection.  Two amplification products per sample  were monitored by real-time PCR using primers/probes s                           pecific to JAK2  wild type (WT) and JAK2 mutant V617F. The [employer]  Quantitation software will compare the patient specimen valuse to the  standard curves and generate percent values for wild type and mutant  type. In vitro studies have indicated that this assay has an  analytical sensitivity of 1%. References:  [place] MADRID, Keith Angulo et al. Acquired mutation of the  tyrosine kinase JAK2 in human myeloproliferative disorders. Lancet. 2005 Mar 19-25; 365(4817):7651-8987. Mukund Huntley JP. A unique clonal JAK2 mutation  leading to constitutive signaling causes polycythaemia vera. Nature. 2005 Apr 28; 505(2269):4145-6831. Gale R, Radha F, Vincenzo AS, et al. A gain-of-function  mutation of JAK2 in myeloproliferative disorders. N Engl J Med.  2005 Apr 28; 352(37):7809-5271.  CALR Mutation Detection Result 10/21/2019 Comment    Final    Comment: (NOTE)  NEGATIVE  No insertions or deletions were detected within the analyzed region  of the calreticulin (CALR) gene. A negative result does not entirely exclude the possibility of a  clonal population carrying CALR gene mutations that are not covered  by this assay. Results should be interpreted in conjunction with  clinical and laboratory findings for the most accurate  interpretation.  Background 10/21/2019 Comment    Final    Comment: (NOTE)  The calcium-binding endoplasmic reticulin chaperone protein,  calreticulin (CALR), is somatically mutated in approximately 70% of  patients with JAK2-negative essential thrombocythemia (ET) and 60-  88% of patients with JAK2-negative primary myelofibrosis(PMF). Only a  minority of patients (approximately 8%) with myelodysplasia have  mutations in  CALR gene. CALR mutations are rarely detected in  patients with de annie acute myeloid leukemia, chronic myelogenous  leukemia, lymphoid leukemia, or solid tumors.  CALR mutations are  not detected in polycythemia and generally appear to be mutually  exclusive with JAK2 mutations and MPL mutations. The majority of mutational changes involve a variety of insertion or  deletion mutations in exon 9 of the calreticulin gene:  approximately 53% of all CALR mutations are a 52 bp deletion (type-1)  while the second most prevalent mutation (approximately 32%) contains  a 5 bp insertion (type-2). Other mutations (non-type 1 or type 2) are  seen                            in a small minority of cases. CALR mutations in PMF tend to be  associated with a favorable prognosis compared to JAK2 V617F  mutations, whereas primary myelofibrosis negative for CALR, JAK2  V617F and MPL mutations (so-called triple negative) is associated  with a poor prognosis and shorter survival.  The detection of a CALR gene mutation aids in the specific diagnosis  of a myeloproliferative neoplasm, and help distinguish this clonal  disease from a benign reactive process.  Methodology 10/21/2019 Comment    Final    Comment: (NOTE)  Genomic DNA was isolated from the provided specimen. Polymerase chain  reaction (PCR) of exon 9 of the CALR gene was performed with  specific fluorescent-labeled primers, and the PCR product was  analyzed by capillary gel electrophoresis to determine the size of  the PCR products. This PCR assay is capable of detecting a mutant  cell population with a sensitivity of 5 mutant cells per 100 normal  cells. A negative result does not exclude the presence of a  myeloproliferative disorder or other neoplastic process. This test was developed and its performance characteristics  determined by [employer]. It has not been cleared or approved by the  Food and Drug Administration. The FDA has determined that such  clearance or approval is not necessary.  References 10/21/2019 Comment    Final    Comment: (NOTE)  1. HEIDI Olivo et al. (2013) Somatic mutations of calreticulin in    myeloproliferative neoplasms. [place]  [facility] 348:[id number].  2. Jolene Chairez al. (2013) Somatic CALR mutations in    myeloproliferative neoplasms with nonmutated JAK2. [facility] [phone]:[id number]. Performed At: [facility]  [address] [place], [place] [id number]  Jorge Hurley MD PU:[id number]  Performed At: [facility]  [address] [place], [place] [id number]  Jorge Hurley MD PP:[id number]      Director Review 10/21/2019 Comment    Final    Comment: (NOTE)  Jack Boyce, PhD, [facility]                Director, [address] [place] [address]                [phone]  Performed At: [facility]  [address] [place], [place] [id number]  Jorge Hurley MD HC:[id number]      Sodium 10/21/2019 141  136 - 145 mmol/L Final    Potassium 10/21/2019 4.1  3.5 - 5.5 mmol/L Final    SLIGHTLY HEMOLYZED SPECIMEN    Chloride 10/21/2019 107  100 - 111 mmol/L Final    CO2 10/21/2019 28  21 - 32 mmol/L Final    Anion gap 10/21/2019 6  3.0 - 18 mmol/L Final    Glucose 10/21/2019 98  74 - 99 mg/dL Final    BUN 10/21/2019 18  7.0 - 18 MG/DL Final    Creatinine 10/21/2019 1.29  0.6 - 1.3 MG/DL Final    BUN/Creatinine ratio 10/21/2019 14  12 - 20   Final    GFR est AA 10/21/2019 >60  >60 ml/min/1.73m2 Final    GFR est non-AA 10/21/2019 >60  >60 ml/min/1.73m2 Final    Comment: (NOTE)  Estimated GFR is calculated using the Modification of Diet in Renal   Disease (MDRD) Study equation, reported for both  Americans   (GFRAA) and non- Americans (GFRNA), and normalized to 1.73m2   body surface area. The physician must decide which value applies to   the patient. The MDRD study equation should only be used in   individuals age 25 or older. It has not been validated for the   following: pregnant women, patients with serious comorbid conditions,   or on certain medications, or persons with extremes of body size,   muscle mass, or nutritional status.       Calcium 10/21/2019 9.1  8.5 - 10.1 MG/DL Final    Bilirubin, total 10/21/2019 0.4  0.2 - 1.0 MG/DL Final    ALT (SGPT) 10/21/2019 76* 16 - 61 U/L Final    AST (SGOT) 10/21/2019 33  10 - 38 U/L Final    SLIGHTLY HEMOLYZED SPECIMEN    Alk. phosphatase 10/21/2019 103  45 - 117 U/L Final    Protein, total 10/21/2019 7.6  6.4 - 8.2 g/dL Final    Albumin 10/21/2019 3.9  3.4 - 5.0 g/dL Final    Globulin 10/21/2019 3.7  2.0 - 4.0 g/dL Final    A-G Ratio 10/21/2019 1.1  0.8 - 1.7   Final    Vitamin B12 10/21/2019 711  211 - 911 pg/mL Final    Folate 10/21/2019 19.9* 3.10 - 17.50 ng/mL Final    Testosterone 10/21/2019 303  264 - 916 ng/dL Final    Comment: (NOTE)  Adult male reference interval is based on a population of  healthy nonobese males (BMI <30) between 23and 44years old. [address] [place] [address], [address] [place] [address] [id number];[phone]. PMID: [id number]. Performed At: [address] [id number]  Serene France MD PU:[id number]      WBC 10/21/2019 5.8  4.6 - 13.2 K/uL Final    RBC 10/21/2019 5.28  4.70 - 5.50 M/uL Final    HGB 10/21/2019 17.3* 13.0 - 16.0 g/dL Final    HCT 10/21/2019 51.0* 36.0 - 48.0 % Final    MCV 10/21/2019 96.6  74.0 - 97.0 FL Final    MCH 10/21/2019 32.8  24.0 - 34.0 PG Final    MCHC 10/21/2019 33.9  31.0 - 37.0 g/dL Final    RDW 10/21/2019 13.9  11.6 - 14.5 % Final    PLATELET 64/19/9326 579  135 - 420 K/uL Final    MPV 10/21/2019 10.4  9.2 - 11.8 FL Final    NEUTROPHILS 10/21/2019 53  40 - 73 % Final    LYMPHOCYTES 10/21/2019 27  21 - 52 % Final    MONOCYTES 10/21/2019 8  3 - 10 % Final    EOSINOPHILS 10/21/2019 10* 0 - 5 % Final    BASOPHILS 10/21/2019 2  0 - 2 % Final    ABS. NEUTROPHILS 10/21/2019 3.1  1.8 - 8.0 K/UL Final    ABS. LYMPHOCYTES 10/21/2019 1.6  0.9 - 3.6 K/UL Final    ABS. MONOCYTES 10/21/2019 0.4  0.05 - 1.2 K/UL Final    ABS. EOSINOPHILS 10/21/2019 0.6* 0.0 - 0.4 K/UL Final    ABS.  BASOPHILS 10/21/2019 0.1  0.0 - 0.1 K/UL Final  DF 10/21/2019 AUTOMATED    Final    MPL Mutation Analysis Result 10/21/2019 Comment    Final    Comment: (NOTE)  No MPL mutation was identified in the provided specimen of this  individual. Results should be interpreted in conjunction with  clinical and other laboratory findings for the most accurate  interpretation.  Background 10/21/2019 Comment    Final    Comment: (NOTE)  MPL (myeloproliferative leukemia virus oncogene homology) belongs to  the hematopoietin superfamily and enables its ligand thrombopoietin  to facilitate both global hematopoiesis and megakaryocyte growth  and differentiation. MPL W515 mutations are present in patients with  primary myelofibrosis (PMF) and essential thrombocythemia (ET) at a  frequency of approximately 5% and 1% respectively. The S505  mutation is detected in patients with hereditary thrombocythemia.  Methodology 10/21/2019 Comment    Final    Comment: (NOTE)  Genomic DNA was purified from the provided specimen. MPL gene region  covering the S505N and W515L/K mutations were subjected to PCR  amplification and bi-directional sequencing in duplicate to  identify sequence variations. This assay has a sensitivity to detect  approximately 20-25% population of cells containing the MPL  mutations in a background of non-mutant cells. This assay will not  detect the mutation below the sensitivity of this assay. Molecular-  based testing is highly accurate, but as in any laboratory test, rare  diagnostic errors may occur.  References 10/21/2019 Comment    Final    Comment: (NOTE)  1. Dick SANTILLAN et al. (2006). QYE344 mutations in    myeloproliferative and other myeloid disorders: a study    of 1182 patients. Blood 952:[id number].  2. Gale Ritchie and Jose MCCRARY. (2008). JAK2 and MPL    mutations in myeloproliferative neoplasms: discovery and    science. Leukemia 22:7117-0249.  3. Ford Cliff Bath, et al. (2009).  Evidence for a  effect    of the MPL-S505N mutation in eight [facility] pedigrees with    hereditary thrombocythemia. Haematologica 94(10):1935- 6742.  Director Review 10/21/2019 Comment    Final    Comment: (NOTE)  Sailaja Garcia, PhD, [facility].     Director, [place] for [address] [facility] and [address] [place] [address] [address], [address] [place] [address]     [phone]  This test was developed and its performance characteristics  determined by [employer]. It has not been cleared or approved  by the Food and Drug Administration. Performed At: [facility]  [address] [place], [place] [id number]  Bailee Gutiérrez MD [id number]  Performed At: [facility]  [address] [place], [place] [id number]  Bailee Gutiérrez MD [id number]            Follow-up and Dispositions    · Return in about 2 weeks (around 9/7/2020).

## 2020-08-24 NOTE — PROGRESS NOTES
Patrice Ponce is a 39 y.o. male (: 1975) presenting to address:    Chief Complaint   Patient presents with    Complete Physical       Vitals:    20 1007   BP: (!) 136/94   Pulse: (!) 101   Resp: 18   Temp: 97.8 °F (36.6 °C)   TempSrc: Oral   SpO2: 95%   Weight: 230 lb 3.2 oz (104.4 kg)   Height: 5' 8\" (1.727 m)   PainSc:   0 - No pain       Hearing/Vision:   No exam data present    Learning Assessment:     Learning Assessment 3/18/2019   PRIMARY LEARNER Patient   PRIMARY LANGUAGE ENGLISH   LEARNER PREFERENCE PRIMARY PICTURES     LISTENING     VIDEOS     DEMONSTRATION   ANSWERED BY patient   RELATIONSHIP SELF     Depression Screening:     3 most recent PHQ Screens 2020   Little interest or pleasure in doing things Not at all   Feeling down, depressed, irritable, or hopeless Not at all   Total Score PHQ 2 0     Fall Risk Assessment:     Fall Risk Assessment, last 12 mths 2020   Able to walk? Yes   Fall in past 12 months? No     Abuse Screening:     Abuse Screening Questionnaire 2020   Do you ever feel afraid of your partner? N   Are you in a relationship with someone who physically or mentally threatens you? N   Is it safe for you to go home? Y     Coordination of Care Questionaire:   1. Have you been to the ER, urgent care clinic since your last visit? Hospitalized since your last visit? NO    2. Have you seen or consulted any other health care providers outside of the [address] [place] [address] since your last visit? Include any pap smears or colon screening. NO    Advanced Directive:   1. Do you have an Advanced Directive? NO    2. Would you like information on Advanced Directives?  NO

## 2020-08-31 NOTE — PROGRESS NOTES
Improved hemoglobin A1c from 6.2-5.6    Improved total cholesterol and LDL    Triglyceride isElevated   HDL which is the good cholesterol has improved  Kidney and liver functions are within normal limit    Continue lifestyle modifications    Otherwise no remarkable abnormalities

## 2020-09-01 ENCOUNTER — TELEPHONE (OUTPATIENT)
Dept: FAMILY MEDICINE CLINIC | Age: 45
End: 2020-09-01

## 2022-03-19 PROBLEM — E66.01 SEVERE OBESITY WITH BODY MASS INDEX (BMI) OF 35.0 TO 39.9 WITH SERIOUS COMORBIDITY (HCC): Status: ACTIVE | Noted: 2018-06-29

## 2022-03-19 PROBLEM — R73.03 PREDIABETES: Status: ACTIVE | Noted: 2019-04-02

## 2022-03-19 PROBLEM — E78.2 MIXED HYPERLIPIDEMIA: Status: ACTIVE | Noted: 2019-04-02

## 2022-03-19 PROBLEM — F20.9 SCHIZOPHRENIA (HCC): Status: ACTIVE | Noted: 2017-01-26

## 2022-03-20 PROBLEM — D75.1 POLYCYTHEMIA: Status: ACTIVE | Noted: 2019-09-25

## 2024-06-05 ENCOUNTER — CARE COORDINATION (OUTPATIENT)
Dept: OTHER | Facility: CLINIC | Age: 49
End: 2024-06-05

## 2024-06-07 ENCOUNTER — CARE COORDINATION (OUTPATIENT)
Dept: OTHER | Facility: CLINIC | Age: 49
End: 2024-06-07

## 2024-06-07 NOTE — CARE COORDINATION
Ambulatory Care Coordination Note     6/7/2024 1:00 PM     patient outreach attempt by this AC today to offer care management services. ACM was unable to reach the patient by telephone today; left voice message requesting a return phone call to this ACM.     ACM: Paula Her RN     Care Summary Note: Left message requesting patient return call.      Follow Up:   Plan for next AC outreach in approximately 1-2 days  to complete:  - outreach attempt to offer care management services.      Paula Her RN BSN  [phone]

## 2024-06-10 ENCOUNTER — CARE COORDINATION (OUTPATIENT)
Dept: OTHER | Facility: CLINIC | Age: 49
End: 2024-06-10

## 2024-06-10 NOTE — CARE COORDINATION
Ambulatory Care Coordination Note     6/10/2024 10:19 AM     patient outreach attempt by this AC today to offer care management services. ACM was unable to reach the patient by telephone today; left voice message requesting a return phone call to this ACM.     ACM: Paula Her RN     Care Summary Note: Unable to reach patient      Follow Up:   Plan for next ACM outreach in approximately 1 week to complete:  - outreach attempt to offer care management services.      Paula Her RN BSN  [phone]

## 2024-06-17 ENCOUNTER — CARE COORDINATION (OUTPATIENT)
Dept: OTHER | Facility: CLINIC | Age: 49
End: 2024-06-17

## 2024-06-17 NOTE — CARE COORDINATION
Ambulatory Care Coordination Note     6/17/2024 9:15 AM     patient outreach attempt by this ACM today to offer care management services. ACM was unable to reach the patient by telephone today;  multiple attempts to reach patient were made.      Patient closed (unable to reach patient) from the High Risk Care Management program on 6/17/2024.  No further Ambulatory Care Manager follow up scheduled.     Paula Her RN BSN  [phone]